# Patient Record
Sex: MALE | Race: WHITE | URBAN - METROPOLITAN AREA
[De-identification: names, ages, dates, MRNs, and addresses within clinical notes are randomized per-mention and may not be internally consistent; named-entity substitution may affect disease eponyms.]

---

## 2018-04-09 ENCOUNTER — RADIANT APPOINTMENT (OUTPATIENT)
Dept: GENERAL RADIOLOGY | Facility: CLINIC | Age: 38
End: 2018-04-09
Attending: PEDIATRICS
Payer: COMMERCIAL

## 2018-04-09 ENCOUNTER — OFFICE VISIT (OUTPATIENT)
Dept: ORTHOPEDICS | Facility: CLINIC | Age: 38
End: 2018-04-09
Payer: COMMERCIAL

## 2018-04-09 VITALS
DIASTOLIC BLOOD PRESSURE: 88 MMHG | WEIGHT: 215 LBS | BODY MASS INDEX: 29.12 KG/M2 | SYSTOLIC BLOOD PRESSURE: 134 MMHG | HEIGHT: 72 IN

## 2018-04-09 DIAGNOSIS — M25.562 LEFT KNEE PAIN, UNSPECIFIED CHRONICITY: Primary | ICD-10-CM

## 2018-04-09 DIAGNOSIS — M25.562 LEFT KNEE PAIN, UNSPECIFIED CHRONICITY: ICD-10-CM

## 2018-04-09 PROCEDURE — 99204 OFFICE O/P NEW MOD 45 MIN: CPT | Performed by: PEDIATRICS

## 2018-04-09 PROCEDURE — 73562 X-RAY EXAM OF KNEE 3: CPT | Performed by: PEDIATRICS

## 2018-04-09 NOTE — LETTER
4/9/2018         RE: Keyur Olivares  [address] [place] [address]        Dear Colleague,    Thank you for referring your patient, Keyur Olivares, to the [facility]. Please see a copy of my visit note below.    Sports Medicine Clinic Visit    PCP: No Ref-Primary, Physician    Keyur Olivares is a 38 year old male who is seen  as a self referral presenting with left knee pain.  Pain has been present for the past few months, but has been increasing over the past week.  No known injury.  Pain is diffuse thru the knee.  Does have pain that travels to his foot with prolonged standing.      **  Left knee pain started out as a slight pain. Patient sits in an awkward position when working on the floor for his job which may be related to pain. Did also get puppy recently. Pain most prominent in the medial knee. Did note crepitus under the patella and swelling. Currently limping. No painful popping.     Injury: gradual onset     Location of Pain: left knee, diffuse   Duration of Pain: 2-3 month(s)  Rating of Pain at worst: 7/10  Rating of Pain Currently: 6/10  Symptoms are better with: knee brace  Symptoms are worse with: walking, prolonged standing, squatting, stairs   Additional Features:   Positive: swelling, grinding and instability   Negative: bruising, popping, catching, locking, paresthesias, numbness, weakness, pain in other joints and systemic symptoms  Other evaluation and/or treatments so far consists of: Nothing  Prior History of related problems: denies     Social History: HVAC    Review of Systems  Musculoskeletal: as above  Remainder of review of systems is negative including constitutional, CV, pulmonary, GI, Skin and Neurologic except as noted in HPI or medical history.    This document serves as a record of the services and decisions personally performed and made by Riaz Peterson DO, CAQ. It was created on his behalf by lor Faulkner  "trained medical scribe. The creation of this document is based the provider's statements to the medical scribe.  Duc Hilton April 9, 2018 11:25 AM      No past medical history on file.  Past Surgical History:   Procedure Laterality Date     ORTHOPEDIC SURGERY  1997    L Shoulder reconstruction     Family History   Problem Relation Age of Onset     Asthma Father      CANCER Father      lung cancer     DIABETES Paternal Grandfather      CANCER Paternal Grandfather      C.A.D. No family hx of      Hypertension No family hx of      CEREBROVASCULAR DISEASE No family hx of      Breast Cancer No family hx of      Cancer - colorectal No family hx of      Prostate Cancer No family hx of      Social History     Social History     Marital status:      Spouse name: N/A     Number of children: N/A     Years of education: N/A     Occupational History     Not on file.     Social History Main Topics     Smoking status: Current Some Day Smoker     Last attempt to quit: 4/15/2014     Smokeless tobacco: Current User     Types: Chew     Alcohol use Yes      Comment: once weekly     Drug use: No     Sexual activity: Yes     Other Topics Concern     Parent/Sibling W/ Cabg, Mi Or Angioplasty Before 65f 55m? No     Social History Narrative       Objective  /88  Ht 5' 11.5\" (1.816 m)  Wt 215 lb (97.5 kg)  BMI 29.57 kg/m2    GENERAL APPEARANCE: healthy, alert and no distress   GAIT: antalgic  SKIN: no suspicious lesions or rashes  NEURO: Normal strength and tone, mentation intact and speech normal  PSYCH:  mentation appears normal and affect normal/bright  HEENT: no scleral icterus  CV: no extremity edema   RESP: nonlabored breathing    Left Knee exam    ROM:        Flexion ~110 degrees, tight        Extension full, symmetric, no change in pain    Inspection:       no visible ecchymosis       Moderate effusion       Mild fullness in the popliteal crease     Skin:       no visible deformities       well perfused       " capillary refill brisk    Patellar Motion:        Normal patellar tracking noted through range of motion    Tender:        medial joint line    Non Tender:         remainder of knee area    Special Tests:        positive (+) Melissa, slight medial knee pain, no clicking, popping or snapping        neg (-) Lachman       neg (-) anterior drawer       neg (-) posterior drawer       neg (-) varus, no pain        neg (-) valgus for laxity, slight pain lateral knee        Medial and posterior knee pain with forced extension    Radiology  Visualized radiographs of left knee obtained today, and reviewed the images with the patient.  Impression: Bilateral Hall, bilateral sunrise, and left lateral views of the knees demonstrate no acute osseous abnormality. Joint spaces appear preserved. There is a left knee joint effusion.       Assessment:  1. Left knee pain, unspecified chronicity    suspect medial meniscus tear.    Plan:  Discussed the assessment with the patient.    Plain films of the area reviewed with the patient.    Discussed:  *Symptom Treatment - Ice, Over the Counter Medications   *Activity Modification -   *Imaging - X-Ray reviewed; consider MRI of the left knee   *Support - sleeve, wrap; knee padding if kneeling  *Rehab - Physical Therapy   *Injection - Corticosteroid injection   *Supplements - Glucosamine (he inquired); he may try if desired  *Referral to Orthopedic Surgeon - possible pending MRI results    Topical Treatments: Ice prn pain, swelling.  Over the counter medication: Patient's preferred OTC medication as directed on packaging.  MRI of the left knee; next step   Activity Modification: discussed what to alter/avoid  Follow up: call with results of MRI   Questions answered. The patient indicates understanding of these issues and agrees with the plan.     Riaz Peterson, DO, CAQ       Disclaimer: This note consists of symbols derived from keyboarding, dictation and/or voice recognition  software. As a result, there may be errors in the script that have gone undetected. Please consider this when interpreting information found in this chart.    The information in this document, created by the medical scribe for me, accurately reflects the services I personally performed and the decisions made by me. I have reviewed and approved this document for accuracy.   Riaz Peterson DO, ELENA     Again, thank you for allowing me to participate in the care of your patient.        Sincerely,        Riaz Peterson DO

## 2018-04-09 NOTE — MR AVS SNAPSHOT
After Visit Summary   4/9/2018    Keyur Olivares    MRN: [id number]           Patient Information     Date Of Birth          1980        Visit Information        Provider Department      4/9/2018 11:00 AM Riaz Peterson,  [facility]        Today's Diagnoses     Left knee pain, unspecified chronicity    -  1      Care Instructions    Schedule MRI     We will call with results of MRI     Continue to ice and modify your activities      Advanced imaging is done by appointment. Some insurance require a prior authorization to be completed which may delay the time until you are able to schedule your appointment. Y[facility] call [facility]: [phone] to schedule your MRI.  Depending on your availability you can usually schedule within the next 1-2 days.    The clinic will call you with results, if you have not heard from the clinic within 3-4 days following your MRI please contact us at the number listed below.   If you have any further questions for your physician or physician s care team you can call [phone] and use option 3 to leave a voice message. Calls received during business hours will be returned same day.                  Follow-ups after your visit        Future tests that were ordered for you today     Open Future Orders        Priority Expected Expires Ordered    MR Knee Left w/o Contrast Routine  4/9/2019 4/9/2018            Who to contact     If you have questions or need follow up information about today's clinic visit or your schedule please contact [facility] directly at [phone].  Normal or non-critical lab and imaging results will be communicated to you by MyChart, letter or phone within 4 business days after the clinic has received the results. If you do not hear from us within 7 days, please contact the clinic through MyChart or phone. If you have a critical or abnormal lab  "result, we will notify you by phone as soon as possible.  Submit refill requests through [employer] or call your pharmacy and they will forward the refill request to us. Please allow 3 business days for your refill to be completed.          Additional Information About Your Visit        [employer] Information     [employer] gives you secure access to your electronic health record. If you see a primary care provider, you can also send messages to your care team and make appointments. If you have questions, please call your primary care clinic.  If you do not have a primary care provider, please call [phone] and they will assist you.        Care EveryWhere ID     This is your Care EveryWhere ID. This could be used by other organizations to access your [facility] medical records  [id number]        Your Vitals Were     Height BMI (Body Mass Index)                5' 11.5\" (1.816 m) 29.57 kg/m2           Blood Pressure from Last 3 Encounters:   04/09/18 134/88   05/07/15 124/82   05/22/14 114/75    Weight from Last 3 Encounters:   04/09/18 215 lb (97.5 kg)   05/07/15 220 lb (99.8 kg)   05/22/14 199 lb (90.3 kg)               Primary Care Provider Fax #    Physician No Ref-Primary [phone]       No address on file        Equal Access to Services     JENNIFER DUQUE : Hadii zulay clark hadasho Soomaali, waaxda luqadaha, qaybta kaalmada adeegyada, waxay idiin hayredn yfn herndon. So [facility] [phone].    ATENCIÓN: Si habla español, tiene a sellers disposición servicios gratuitos de asistencia lingüística. Llame al [phone].    We comply with applicable federal civil rights laws and [place] laws. We do not discriminate on the basis of race, color, national origin, age, disability, sex, sexual orientation, or gender identity.            Thank you!     Thank you for choosing [facility]  for your care. Our goal is always to provide you with excellent care. Hearing back from our patients is one " way we can continue to improve our services. Please take a few minutes to complete the written survey that you may receive in the mail after your visit with us. Thank you!             Your Updated Medication List - Protect others around you: Learn how to safely use, store and throw away your medicines at www.disposemymeds.org.          This list is accurate as of 4/9/18 11:41 AM.  Always use your most recent med list.                   Brand Name Dispense Instructions for use Diagnosis    metroNIDAZOLE 500 MG tablet    FLAGYL    14 tablet    Take 1 tablet (500 mg) by mouth 2 times daily    Screen for STD (sexually transmitted disease)       tadalafil 10 MG tablet    CIALIS    12 tablet    Take 0.5-1 tablets (5-10 mg) by mouth daily as needed for erectile dysfunction Never use with nitroglycerin, terazosin or doxazosin.    ED (erectile dysfunction)

## 2018-04-09 NOTE — PATIENT INSTRUCTIONS
Schedule MRI     We will call with results of MRI     Continue to ice and modify your activities      Advanced imaging is done by appointment. Some insurance require a prior authorization to be completed which may delay the time until you are able to schedule your appointment. [place] call [place] [place] [facility] [place]: [phone] to schedule your MRI.  Depending on your availability you can usually schedule within the next 1-2 days.    The clinic will call you with results, if you have not heard from the clinic within 3-4 days following your MRI please contact us at the number listed below.   If you have any further questions for your physician or physician s care team you can call [phone] and use option 3 to leave a voice message. Calls received during business hours will be returned same day.

## 2018-04-09 NOTE — PROGRESS NOTES
Sports Medicine Clinic Visit    PCP: No Ref-Primary, Physician    Keyur Olivares is a 38 year old male who is seen  as a self referral presenting with left knee pain.  Pain has been present for the past few months, but has been increasing over the past week.  No known injury.  Pain is diffuse thru the knee.  Does have pain that travels to his foot with prolonged standing.      **  Left knee pain started out as a slight pain. Patient sits in an awkward position when working on the floor for his job which may be related to pain. Did also get puppy recently. Pain most prominent in the medial knee. Did note crepitus under the patella and swelling. Currently limping. No painful popping.     Injury: gradual onset     Location of Pain: left knee, diffuse   Duration of Pain: 2-3 month(s)  Rating of Pain at worst: 7/10  Rating of Pain Currently: 6/10  Symptoms are better with: knee brace  Symptoms are worse with: walking, prolonged standing, squatting, stairs   Additional Features:   Positive: swelling, grinding and instability   Negative: bruising, popping, catching, locking, paresthesias, numbness, weakness, pain in other joints and systemic symptoms  Other evaluation and/or treatments so far consists of: Nothing  Prior History of related problems: denies     Social History: HVAC    Review of Systems  Musculoskeletal: as above  Remainder of review of systems is negative including constitutional, CV, pulmonary, GI, Skin and Neurologic except as noted in HPI or medical history.    This document serves as a record of the services and decisions personally performed and made by Riaz Peterson DO, CAQ. It was created on his behalf by Duc Hilton, a trained medical scribe. The creation of this document is based the provider's statements to the medical scribe.  Duc Hilton April 9, 2018 11:25 AM      No past medical history on file.  Past Surgical History:   Procedure Laterality Date     ORTHOPEDIC SURGERY  1997  "   L Shoulder reconstruction     Family History   Problem Relation Age of Onset     Asthma Father      CANCER Father      lung cancer     DIABETES Paternal Grandfather      CANCER Paternal Grandfather      C.A.D. No family hx of      Hypertension No family hx of      CEREBROVASCULAR DISEASE No family hx of      Breast Cancer No family hx of      Cancer - colorectal No family hx of      Prostate Cancer No family hx of      Social History     Social History     Marital status:      Spouse name: N/A     Number of children: N/A     Years of education: N/A     Occupational History     Not on file.     Social History Main Topics     Smoking status: Current Some Day Smoker     Last attempt to quit: 4/15/2014     Smokeless tobacco: Current User     Types: Chew     Alcohol use Yes      Comment: once weekly     Drug use: No     Sexual activity: Yes     Other Topics Concern     Parent/Sibling W/ Cabg, Mi Or Angioplasty Before 65f 55m? No     Social History Narrative       Objective  /88  Ht 5' 11.5\" (1.816 m)  Wt 215 lb (97.5 kg)  BMI 29.57 kg/m2    GENERAL APPEARANCE: healthy, alert and no distress   GAIT: antalgic  SKIN: no suspicious lesions or rashes  NEURO: Normal strength and tone, mentation intact and speech normal  PSYCH:  mentation appears normal and affect normal/bright  HEENT: no scleral icterus  CV: no extremity edema   RESP: nonlabored breathing    Left Knee exam    ROM:        Flexion ~110 degrees, tight        Extension full, symmetric, no change in pain    Inspection:       no visible ecchymosis       Moderate effusion       Mild fullness in the popliteal crease     Skin:       no visible deformities       well perfused       capillary refill brisk    Patellar Motion:        Normal patellar tracking noted through range of motion    Tender:        medial joint line    Non Tender:         remainder of knee area    Special Tests:        positive (+) Melissa, slight medial knee pain, no clicking, " popping or snapping        neg (-) Lachman       neg (-) anterior drawer       neg (-) posterior drawer       neg (-) varus, no pain        neg (-) valgus for laxity, slight pain lateral knee        Medial and posterior knee pain with forced extension    Radiology  Visualized radiographs of left knee obtained today, and reviewed the images with the patient.  Impression: Bilateral Hall, bilateral sunrise, and left lateral views of the knees demonstrate no acute osseous abnormality. Joint spaces appear preserved. There is a left knee joint effusion.       Assessment:  1. Left knee pain, unspecified chronicity    suspect medial meniscus tear.    Plan:  Discussed the assessment with the patient.    Plain films of the area reviewed with the patient.    Discussed:  *Symptom Treatment - Ice, Over the Counter Medications   *Activity Modification -   *Imaging - X-Ray reviewed; consider MRI of the left knee   *Support - sleeve, wrap; knee padding if kneeling  *Rehab - Physical Therapy   *Injection - Corticosteroid injection   *Supplements - Glucosamine (he inquired); he may try if desired  *Referral to Orthopedic Surgeon - possible pending MRI results    Topical Treatments: Ice prn pain, swelling.  Over the counter medication: Patient's preferred OTC medication as directed on packaging.  MRI of the left knee; next step   Activity Modification: discussed what to alter/avoid  Follow up: call with results of MRI   Questions answered. The patient indicates understanding of these issues and agrees with the plan.     Riaz Peterson, , CAQ       Disclaimer: This note consists of symbols derived from keyboarding, dictation and/or voice recognition software. As a result, there may be errors in the script that have gone undetected. Please consider this when interpreting information found in this chart.    The information in this document, created by the medical scribe for me, accurately reflects the services I personally  performed and the decisions made by me. I have reviewed and approved this document for accuracy.   Riaz Peterson DO, CAQ

## 2018-04-11 ENCOUNTER — RADIANT APPOINTMENT (OUTPATIENT)
Dept: MRI IMAGING | Facility: CLINIC | Age: 38
End: 2018-04-11
Attending: PEDIATRICS
Payer: COMMERCIAL

## 2018-04-11 ENCOUNTER — RADIANT APPOINTMENT (OUTPATIENT)
Dept: GENERAL RADIOLOGY | Facility: CLINIC | Age: 38
End: 2018-04-11
Attending: PEDIATRICS
Payer: COMMERCIAL

## 2018-04-11 DIAGNOSIS — T15.90XA: ICD-10-CM

## 2018-04-11 DIAGNOSIS — M25.562 LEFT KNEE PAIN, UNSPECIFIED CHRONICITY: ICD-10-CM

## 2018-04-11 PROCEDURE — 70030 X-RAY EYE FOR FOREIGN BODY: CPT | Mod: FY

## 2018-04-11 PROCEDURE — 73721 MRI JNT OF LWR EXTRE W/O DYE: CPT | Mod: TC

## 2018-04-13 ENCOUNTER — TELEPHONE (OUTPATIENT)
Dept: ORTHOPEDICS | Facility: CLINIC | Age: 38
End: 2018-04-13

## 2018-04-13 DIAGNOSIS — S83.242D OTHER TEAR OF MEDIAL MENISCUS OF LEFT KNEE, UNSPECIFIED WHETHER OLD OR CURRENT TEAR, SUBSEQUENT ENCOUNTER: Primary | ICD-10-CM

## 2018-04-13 NOTE — TELEPHONE ENCOUNTER
Results for orders placed or performed in visit on 04/11/18   MR Knee Left w/o Contrast    Narrative    MR LEFT KNEE WITHOUT CONTRAST   4/11/2018 1:48 PM    HISTORY:  Evaluate medial meniscus. Left knee pain, unspecified  chronicity.    TECHNIQUE:  Sagittal proton density and T2, coronal T1, and coronal  and transverse fat suppressed T2 weighted images.    FINDINGS:   Medial Meniscus: There is a horizontal tear in the posterior horn  extending from the inferior articular surface to the meniscocapsular  junction. This extends concentrically to the body of the medial  meniscus. A small displaced meniscal flap is suggested far posteriorly  adjacent to the posterior cruciate ligament. Small amount of fluid is  noted in the tibia collateral ligament bursa. No meniscal cyst.       Lateral Meniscus: No tear, displaced fragment, or extrusion.       Anterior Cruciate Ligament: The ACL appears intact. Small lobulated  cysts are noted between the ACL and posterior cruciate ligament,  likely representing a small ganglion cyst.     Posterior Cruciate Ligament: The posterior cruciate ligament appears  intact.     Medial Collateral Ligament: No sprain or tear identified.    Lateral Collateral Ligament Complex, Popliteus Tendon: The fibular  collateral ligament, biceps femoris tendon, popliteal tendon, and  iliotibial band are intact.    Osseous Structures and Cartilaginous Surfaces:  Articular cartilage  surfaces in the medial, lateral, and patellofemoral compartments  appear within normal limits. Marrow signal is within normal limits.    Extensor Mechanism: The quadriceps and infrapatellar tendons are  intact. The medial and lateral patellar retinacula appear  unremarkable.    Joint Space: There is a moderate joint effusion.      Impression    IMPRESSION:    1. MCL posterior horn horizontal tear with suspected small meniscal  flap far posteriorly adjacent to the posterior cruciate ligament.  2. Septated cyst likely representing a  small ganglion cyst between the  ACL and posterior cruciate ligament. The cruciate ligaments appear  intact.  3. Joint effusion.    ANNAMARIE CABRERA MD

## 2018-04-13 NOTE — TELEPHONE ENCOUNTER
"MRI confirms medial mensicus tear. Remainder of findings are as noted in report.  Options: 1) symptom treatment, activity modification, rehab; 2) trial steroid injection for pain relief (will not \"fix\" tear); 3) referral to ortho surgeon for discussion of other intervention, which may include surgery.  If interested in injection or further discussion, return to clinic. May refer if he desires.  Thanks.  Riaz Peterson, , CAQ    "

## 2018-04-19 ENCOUNTER — OFFICE VISIT (OUTPATIENT)
Dept: ORTHOPEDICS | Facility: CLINIC | Age: 38
End: 2018-04-19
Payer: COMMERCIAL

## 2018-04-19 VITALS
HEIGHT: 72 IN | RESPIRATION RATE: 16 BRPM | SYSTOLIC BLOOD PRESSURE: 137 MMHG | WEIGHT: 216.6 LBS | BODY MASS INDEX: 29.34 KG/M2 | DIASTOLIC BLOOD PRESSURE: 84 MMHG

## 2018-04-19 DIAGNOSIS — S83.242A TEAR OF MEDIAL MENISCUS OF LEFT KNEE, CURRENT, UNSPECIFIED TEAR TYPE, INITIAL ENCOUNTER: Primary | ICD-10-CM

## 2018-04-19 PROCEDURE — 99204 OFFICE O/P NEW MOD 45 MIN: CPT | Performed by: ORTHOPAEDIC SURGERY

## 2018-04-19 ASSESSMENT — PAIN SCALES - GENERAL: PAINLEVEL: SEVERE PAIN (6)

## 2018-04-19 NOTE — PATIENT INSTRUCTIONS
SMOKING CESSATION  What's in cigarette smoke? - Cigarette smoke contains over 4,000 chemicals. Nicotine is one of the main ingredients which is an insecticide/herbicide. It is poisonous to our nervous system, increases blood clotting risk, and decreases the body's defenses to fight off infection. Another chemical is Carbon Monoxide is an asphyxiating gas that permanently binds to blood cells and blocks the transport of oxygen. This leads to tissue death and decreases your metabolism. Tar is a chemical that coats your lungs and trachea which impairs new oxygen coming in and carbon dioxide getting out of your body.   How does smoking impact surgery? - Smoking is particularly hazardous with regards to surgery. Surgery puts stress on the body and a smoker's body is already under strain from these chemicals. Putting the two together, especially for an elective surgery, could be a recipe for disaster. Smoking before and after surgery increases your risk of heart problems, slow wound healing, delayed bone healing, blood clots, wound infection and anesthesia complications.   What are the benefits of quitting? - In 20 minutes your blood pressure will drop back down to normal. In 8 hours the carbon monoxide (a toxic gas) levels in your blood stream will drop by half, and oxygen levels will return to normal. In 48 hours your chance of having a heart attack will have decreased. All nicotine will have left your body. Your sense of taste and smell will return to a normal level. In 72 hours your bronchial tubes will relax, and your energy levels will increase. In 2 weeks your circulation will increase, and it will continue to improve for the next 10 weeks.    Recommendations for elective surgery - Ideally, patients should quit smoking 8 weeks before and at least 2 weeks after elective surgery in order to avoid complications. Simply cutting back on the amount of cigarettes smoked per day does not offer any benefit or decrease the  risk of poor wound healing, heart problems, and infection. Smokers should also start taking Vitamin C and B for two weeks before surgery and two weeks after surgery.    Ways to Stop Smokin. Nicotine patches, lozenges, or gum  2. Support Groups  3. Medications (see below)    List of Medications:  1. Varenicline Tartrate (CHANTIX)   2. Bupropion HCL (WELLBUTRIN, ZYBAN) - note: make sure Wellbutrin is for smoking cessation and not other issues   3. Nicotine Patch (NICODERM)   4. Nicotine Inhaler (NICOTROL)   5. Nicotine Gum Nicotine Polacrilex   6. Nicotine Lozenge: Nicotine Polacrilex (COMMIT)   * [facility] offers a smoking support group as well!  Please visit: [email or web address]  If you are interested in these, ask about getting a prescription or talk to your primary care doctor about what may be the best way for you to quit.

## 2018-04-19 NOTE — MR AVS SNAPSHOT
After Visit Summary   4/19/2018    Keyur Olivares    MRN: [id number]           Patient Information     Date Of Birth          1980        Visit Information        Provider Department      4/19/2018 4:15 PM Dustin Menchaca MD [facility]        Today's Diagnoses     Tear of medial meniscus of left knee, current, unspecified tear type, initial encounter    -  1      Care Instructions    SMOKING CESSATION  What's in cigarette smoke? - Cigarette smoke contains over 4,000 chemicals. Nicotine is one of the main ingredients which is an insecticide/herbicide. It is poisonous to our nervous system, increases blood clotting risk, and decreases the body's defenses to fight off infection. Another chemical is Carbon Monoxide is an asphyxiating gas that permanently binds to blood cells and blocks the transport of oxygen. This leads to tissue death and decreases your metabolism. Tar is a chemical that coats your lungs and trachea which impairs new oxygen coming in and carbon dioxide getting out of your body.   How does smoking impact surgery? - Smoking is particularly hazardous with regards to surgery. Surgery puts stress on the body and a smoker's body is already under strain from these chemicals. Putting the two together, especially for an elective surgery, could be a recipe for disaster. Smoking before and after surgery increases your risk of heart problems, slow wound healing, delayed bone healing, blood clots, wound infection and anesthesia complications.   What are the benefits of quitting? - In 20 minutes your blood pressure will drop back down to normal. In 8 hours the carbon monoxide (a toxic gas) levels in your blood stream will drop by half, and oxygen levels will return to normal. In 48 hours your chance of having a heart attack will have decreased. All nicotine will have left your body. Your sense of taste and smell will return to a normal level. In 72 hours  your bronchial tubes will relax, and your energy levels will increase. In 2 weeks your circulation will increase, and it will continue to improve for the next 10 weeks.    Recommendations for elective surgery - Ideally, patients should quit smoking 8 weeks before and at least 2 weeks after elective surgery in order to avoid complications. Simply cutting back on the amount of cigarettes smoked per day does not offer any benefit or decrease the risk of poor wound healing, heart problems, and infection. Smokers should also start taking Vitamin C and B for two weeks before surgery and two weeks after surgery.    Ways to Stop Smokin. Nicotine patches, lozenges, or gum  2. Support Groups  3. Medications (see below)    List of Medications:  1. Varenicline Tartrate (CHANTIX)   2. Bupropion HCL (WELLBUTRIN, ZYBAN) - note: make sure Wellbutrin is for smoking cessation and not other issues   3. Nicotine Patch (NICODERM)   4. Nicotine Inhaler (NICOTROL)   5. Nicotine Gum Nicotine Polacrilex   6. Nicotine Lozenge: Nicotine Polacrilex (COMMIT)   * [place] offers a smoking support group as well!  Please visit: [email or web address]  If you are interested in these, ask about getting a prescription or talk to your primary care doctor about what may be the best way for you to quit.                 Follow-ups after your visit        Follow-up notes from your care team     Return for Postop Visit.      Who to contact     If you have questions or need follow up information about today's clinic visit or your schedule please contact [facility] directly at [phone].  Normal or non-critical lab and imaging results will be communicated to you by MyChart, letter or phone within 4 business days after the clinic has received the results. If you do not hear from us within 7 days, please contact the clinic through MyChart or phone. If you have a critical or abnormal lab result, we will  notify you by phone as soon as possible.  Submit refill requests through [employer] or call your pharmacy and they will forward the refill request to us. Please allow 3 business days for your refill to be completed.          Additional Information About Your Visit        [employer] Information     [employer] gives you secure access to your electronic health record. If you see a primary care provider, you can also send messages to your care team and make appointments. If you have questions, please call your primary care clinic.  If you do not have a primary care provider, please call [phone] and they will assist you.        Care EveryWhere ID     This is your Care EveryWhere ID. This could be used by other organizations to access your [facility] medical records  [id number]        Your Vitals Were     Respirations Height BMI (Body Mass Index)             16 6' (1.829 m) 29.38 kg/m2          Blood Pressure from Last 3 Encounters:   04/19/18 137/84   04/09/18 134/88   05/07/15 124/82    Weight from Last 3 Encounters:   04/19/18 216 lb 9.6 oz (98.2 kg)   04/09/18 215 lb (97.5 kg)   05/07/15 220 lb (99.8 kg)              We Performed the Following     [facility]-Operative Worksheet        Primary Care Provider Fax #    Physician No Ref-Primary [phone]       No address on file        Equal Access to Services     JENNIFER DUQUE : Hadii zulay ku hadasho Soomaali, waaxda luqadaha, qaybta kaalmada adeegyada, hussein man . So [facility] [phone].    ATENCIÓN: Si habla español, tiene a sellers disposición servicios gratuitos de asistencia lingüística. Llame al [phone].    We comply with applicable federal civil rights laws and [place] laws. We do not discriminate on the basis of race, color, national origin, age, disability, sex, sexual orientation, or gender identity.            Thank you!     Thank you for choosing [facility]  for your care. Our goal is always to provide you  with excellent care. Hearing back from our patients is one way we can continue to improve our services. Please take a few minutes to complete the written survey that you may receive in the mail after your visit with us. Thank you!             Your Updated Medication List - Protect others around you: Learn how to safely use, store and throw away your medicines at www.disposemymeds.org.          This list is accurate as of 4/19/18 11:59 PM.  Always use your most recent med list.                   Brand Name Dispense Instructions for use Diagnosis    metroNIDAZOLE 500 MG tablet    FLAGYL    14 tablet    Take 1 tablet (500 mg) by mouth 2 times daily    Screen for STD (sexually transmitted disease)       tadalafil 10 MG tablet    CIALIS    12 tablet    Take 0.5-1 tablets (5-10 mg) by mouth daily as needed for erectile dysfunction Never use with nitroglycerin, terazosin or doxazosin.    ED (erectile dysfunction)

## 2018-04-19 NOTE — PROGRESS NOTES
"CHIEF COMPLAINT:   Chief Complaint   Patient presents with     Knee Pain     Left knee pain. medial and lateral joint line. Onset: 3 months. NKI. Has been icing, elevating, bracing. Ibuprofen. works in [employer]     Keyur Olivares is seen today in the [facility] for evaluation of left knee pain at the request of Dr. Riaz Peterson.     HISTORY OF PRESENT ILLNESS    Keyur \"OLGA\"ROCKY Olivares is a 38 year old male seen for evaluation of ongoing left knee pain with no known injury. Pain has been present for 3 months, since January 2018. He presents today with severe pain, rated a 6/10. Pain is located over the medial lateral and posterior knee. Pain is worsened with stairs. He has noticed swelling in the knee with activities. For treatment, he takes ibuprofen 800 mg daily and uses the brace. He does not recall any increase or change in activities that may have caused knee pain. No history of knee problems. Denies low back or hip problems.     Present symptoms: severe pain, + locking and catching, + swelling.    Pain severity: 6/10  Frequency of symptoms: frequently  Exacerbating Factors: weight bearing  Relieving Factors: at rest  Night Pain: No  Pain while at rest: No   Numbness or tingling: No   Patient has tried:     NSAIDS: Yes      Physical Therapy: No      Activity modification: Yes      Bracing: Yes      Injections: No     Ice: No      Assistive device:  No     Other: none    Orthopaedic PMH: none    Other PMH:  has a past medical history of Environmental allergies.  Patient Active Problem List    Diagnosis Date Noted     ED (erectile dysfunction) 08/06/2015     Priority: Medium     CARDIOVASCULAR SCREENING; LDL GOAL LESS THAN 160 03/25/2013     Priority: Medium       Surgical Hx:  has a past surgical history that includes orthopedic surgery (1997).    Medications:   Current Outpatient Prescriptions:      metroNIDAZOLE (FLAGYL) 500 MG tablet, Take 1 tablet (500 mg) by mouth 2 times daily, " Disp: 14 tablet, Rfl: 0     tadalafil (CIALIS) 10 MG tablet, Take 0.5-1 tablets (5-10 mg) by mouth daily as needed for erectile dysfunction Never use with nitroglycerin, terazosin or doxazosin., Disp: 12 tablet, Rfl: 11    Allergies: No Known Allergies    Social Hx: . reports that he has been smoking.  His smokeless tobacco use includes Chew. He reports that he drinks alcohol. He reports that he does not use illicit drugs.    Family Hx: family history includes Asthma in his father; CANCER in his father and paternal grandfather; DIABETES in his paternal grandfather; MENTAL ILLNESS in his father. There is no history of C.A.D., Hypertension, CEREBROVASCULAR DISEASE, Breast Cancer, Cancer - colorectal, or Prostate Cancer.    REVIEW OF SYSTEMS: 10 point ROS neg other than the symptoms noted above in the HPI and PMH. Notables include  CONSTITUTIONAL:NEGATIVE for fever, chills, change in weight  INTEGUMENTARY/SKIN: NEGATIVE for worrisome rashes, moles or lesions  MUSCULOSKELETAL:See HPI above  NEURO: NEGATIVE for weakness, dizziness or paresthesias    This document serves as a record of the services and decisions personally performed and made by Dustin Menchaca MD. It was created on his behalf by Erendira Garcia, a trained medical scribe. The creation of this document is based the provider's statements to the medical scribe.    Yobany Garcia 4:36 PM 4/19/2018    PHYSICAL EXAM:  /84  Resp 16  Ht 6' (1.829 m)  Wt 216 lb 9.6 oz (98.2 kg)  BMI 29.38 kg/m2   GENERAL APPEARANCE: healthy, alert, no distress  SKIN: no suspicious lesions or rashes  NEURO: Normal strength and tone, mentation intact and speech normal  PSYCH:  mentation appears normal and affect normal, not anxious  RESPIRATORY: No increased work of breathing.  HANDS: no clubbing, nail pitting.    BILATERAL LOWER EXTREMITIES:  Gait: normal  Alignment: varus  No gross deformities or masses.  No Quad atrophy, strength normal.  Intact sensation  deep peroneal nerve, superficial peroneal nerve, med/lat tibial nerve, sural nerve, saphenous nerve  Intact EHL, EDL, TA, FHL, GS, quadriceps hamstrings and hip flexors  Toes warm and well perfused, brisk capillary refill. Palpable 2+ dp pulses.  Bilateral calf soft and nttp or squeeze.  No palpable popliteal lymphadenopathy.  DTRs: achilles 2+, patella 2+.  Edema: none  Hips with full, pain-free motion. No irritability with flexion, adduction, and internal rotation.    LEFT KNEE EXAM:    Skin: intact, no ecchymosis or erythema, abrasion anterior left leg, callused skin over anterior left knee.   Squat: 50%, anterolateral discomfort     ROM: 0 extension, posterior discomfort with forced extension to 130 flexion  Tight hamstrings on straight leg raise.  Effusion: small  Tender: medial joint line, patellar tendon   NTTP lat joint line, anterior or posterior knee  McMurrays: positive - medial    MCL: stable, and non-painful at both 0 and 30 degrees knee flexion  Varus stress: stable, and non-painful at both 0 and 30 degrees knee flexion  Lachmans: neg, firm endpoint  Posterior Drawer stable  Patellofemoral joint:                Apprehension: negative              Crepitations: mild   Grind: positive    RIGHT KNEE EXAM:    Skin: intact, no ecchymosis or erythema, callused skin over anterior right knee  ROM: 1 hyperextension to 135+ flexion  Tight hamstrings on straight leg raise.  Effusion: none  Tender: NTTP med/lat joint line, anterior or posterior knee  McMurrays: negative    MCL: stable, and non-painful at both 0 and 30 degrees knee flexion  Varus stress: stable, and non-painful at both 0 and 30 degrees knee flexion  Lachmans: neg, firm endpoint  Posterior Drawer stable  Patellofemoral joint:                Apprehension: negative              Crepitations: minimal   Grind: negative    X-RAY:  Bilateral king, bilateral sunrise and lateral left knee from 4/9/2018 were reviewed in clinic today. On my review, no  obvious fractures or dislocations. Effusion.      MRI:  MRI left knee from 4/19/2018 was reviewed in clinic today.    IMPRESSION:    1. Medial meniscus posterior horn horizontal tear with suspected small meniscal  flap far posteriorly adjacent to the posterior cruciate ligament.  2. Septated cyst likely representing a small ganglion cyst between the  ACL and posterior cruciate ligament. The cruciate ligaments appear  intact.  3. Joint effusion.      Impression:   38 year old male with acute left knee pain, medial meniscus tear, chondromalacia.    Plan:   * discussed injury with patient, what appears to be a left knee medial meniscal tear on MRI, as well as some underlying mild chondromalacia changes, which is consistent with symptoms and physical examination findings.     * Discussed treatment options including nonoperative treatment with continued rest, ice, elevation, activity modification, NSAIDS and Physical Therapy, bracing and potential injections versus surgical treatment with arthroscopy and meniscal repair versus debridement. Risks and benefits of each discussed in detail.  * in the setting of underlying chondrosis, predictability of arthroscopy is uncertain, unless mechanical symptoms present due to the meniscus tear.    * surgical risks discussed: bleeding, infection, pain, scar, damage to adjacent structures (nerve, vessels, cartilage), stiffness, post-traumatic arthritis, failure to relieve symptoms, recurrence of symptoms, blood clots (DVT), pulmonary emolism, risks of anesthesia and death. This surgery is not intended nor expected to alleviate arthritic pain symptoms, nor will it treat or correct underlying arthritic changes. Arthritis and symptoms related to arthritis could worsen with arthroscopy and meniscal debridement. Patient understands.    * understanding the risks of surgery, patient elected to proceed with arthroscopy  * plan: left knee arthroscopy with partial meniscal debridement versus  repair, outpatient surgery  * will arrange at a time in future mutual convenience  * will need H+P from PCP prior to surgery  * patient will be on ASA x2 weeks postoperative, as well as use of TEDs, crutches  * plan Physical Therapy to start 1-2 weeks postoperative, to be determined at postoperative visit.  * return to clinic 2 week postop for wound check, sooner as needed.  * all questions addressed and answered prior to discharge from clinic today.  * patient to call if any questions or concerns in the meantime.    * BP recheck within normal limits.    The information in this document, created by a scribe for me, accurately reflects the services I personally performed and the decisions made by me. I have reviewed and approved this document for accuracy.     Dustin Menchaca M.D., M.S.  Dept. of Orthopaedic Surgery  [facility]

## 2018-04-19 NOTE — LETTER
"    4/19/2018         RE: Keyur Olivares  [address] [place] [address]        Dear Colleague,    Thank you for referring your patient, Keyur Olivares, to the [facility]. Please see a copy of my visit note below.    CHIEF COMPLAINT:   Chief Complaint   Patient presents with     Knee Pain     Left knee pain. medial and lateral joint line. Onset: 3 months. NKI. Has been icing, elevating, bracing. Ibuprofen. works in [employer]     Keyur Olivares is seen today in the [facility] for evaluation of left knee pain at the request of Dr. Riaz Peterson.     HISTORY OF PRESENT ILLNESS    Keyur \"OLGA\"ROCKY Olivares is a 38 year old male seen for evaluation of ongoing left knee pain with no known injury. Pain has been present for 3 months, since January 2018. He presents today with severe pain, rated a 6/10. Pain is located over the medial lateral and posterior knee. Pain is worsened with stairs. He has noticed swelling in the knee with activities. For treatment, he takes ibuprofen 800 mg daily and uses the brace. He does not recall any increase or change in activities that may have caused knee pain. No history of knee problems. Denies low back or hip problems.     Present symptoms: severe pain, + locking and catching, + swelling.    Pain severity: 6/10  Frequency of symptoms: frequently  Exacerbating Factors: weight bearing  Relieving Factors: at rest  Night Pain: No  Pain while at rest: No   Numbness or tingling: No   Patient has tried:     NSAIDS: Yes      Physical Therapy: No      Activity modification: Yes      Bracing: Yes      Injections: No     Ice: No      Assistive device:  No     Other: none    Orthopaedic PMH: none    Other PMH:  has a past medical history of Environmental allergies.  Patient Active Problem List    Diagnosis Date Noted     ED (erectile dysfunction) 08/06/2015     Priority: Medium     CARDIOVASCULAR SCREENING; LDL GOAL LESS " THAN 160 03/25/2013     Priority: Medium       Surgical Hx:  has a past surgical history that includes orthopedic surgery (1997).    Medications:   Current Outpatient Prescriptions:      metroNIDAZOLE (FLAGYL) 500 MG tablet, Take 1 tablet (500 mg) by mouth 2 times daily, Disp: 14 tablet, Rfl: 0     tadalafil (CIALIS) 10 MG tablet, Take 0.5-1 tablets (5-10 mg) by mouth daily as needed for erectile dysfunction Never use with nitroglycerin, terazosin or doxazosin., Disp: 12 tablet, Rfl: 11    Allergies: No Known Allergies    Social Hx: . reports that he has been smoking.  His smokeless tobacco use includes Chew. He reports that he drinks alcohol. He reports that he does not use illicit drugs.    Family Hx: family history includes Asthma in his father; CANCER in his father and paternal grandfather; DIABETES in his paternal grandfather; MENTAL ILLNESS in his father. There is no history of C.A.D., Hypertension, CEREBROVASCULAR DISEASE, Breast Cancer, Cancer - colorectal, or Prostate Cancer.    REVIEW OF SYSTEMS: 10 point ROS neg other than the symptoms noted above in the HPI and PMH. Notables include  CONSTITUTIONAL:NEGATIVE for fever, chills, change in weight  INTEGUMENTARY/SKIN: NEGATIVE for worrisome rashes, moles or lesions  MUSCULOSKELETAL:See HPI above  NEURO: NEGATIVE for weakness, dizziness or paresthesias    This document serves as a record of the services and decisions personally performed and made by Dustin Menchaca MD. It was created on his behalf by Erendira Garcia, a trained medical scribe. The creation of this document is based the provider's statements to the medical scribe.    Scribrandall Garcia 4:36 PM 4/19/2018    PHYSICAL EXAM:  /84  Resp 16  Ht 6' (1.829 m)  Wt 216 lb 9.6 oz (98.2 kg)  BMI 29.38 kg/m2   GENERAL APPEARANCE: healthy, alert, no distress  SKIN: no suspicious lesions or rashes  NEURO: Normal strength and tone, mentation intact and speech normal  PSYCH:  mentation appears  normal and affect normal, not anxious  RESPIRATORY: No increased work of breathing.  HANDS: no clubbing, nail pitting.    BILATERAL LOWER EXTREMITIES:  Gait: normal  Alignment: varus  No gross deformities or masses.  No Quad atrophy, strength normal.  Intact sensation deep peroneal nerve, superficial peroneal nerve, med/lat tibial nerve, sural nerve, saphenous nerve  Intact EHL, EDL, TA, FHL, GS, quadriceps hamstrings and hip flexors  Toes warm and well perfused, brisk capillary refill. Palpable 2+ dp pulses.  Bilateral calf soft and nttp or squeeze.  No palpable popliteal lymphadenopathy.  DTRs: achilles 2+, patella 2+.  Edema: none  Hips with full, pain-free motion. No irritability with flexion, adduction, and internal rotation.    LEFT KNEE EXAM:    Skin: intact, no ecchymosis or erythema, abrasion anterior left leg, callused skin over anterior left knee.   Squat: 50%, anterolateral discomfort     ROM: 0 extension, posterior discomfort with forced extension to 130 flexion  Tight hamstrings on straight leg raise.  Effusion: small  Tender: medial joint line, patellar tendon   NTTP lat joint line, anterior or posterior knee  McMurrays: positive - medial    MCL: stable, and non-painful at both 0 and 30 degrees knee flexion  Varus stress: stable, and non-painful at both 0 and 30 degrees knee flexion  Lachmans: neg, firm endpoint  Posterior Drawer stable  Patellofemoral joint:                Apprehension: negative              Crepitations: mild   Grind: positive    RIGHT KNEE EXAM:    Skin: intact, no ecchymosis or erythema, callused skin over anterior right knee  ROM: 1 hyperextension to 135+ flexion  Tight hamstrings on straight leg raise.  Effusion: none  Tender: NTTP med/lat joint line, anterior or posterior knee  McMurrays: negative    MCL: stable, and non-painful at both 0 and 30 degrees knee flexion  Varus stress: stable, and non-painful at both 0 and 30 degrees knee flexion  Lachmans: neg, firm  endpoint  Posterior Drawer stable  Patellofemoral joint:                Apprehension: negative              Crepitations: minimal   Grind: negative    X-RAY:  Bilateral king, bilateral sunrise and lateral left knee from 4/9/2018 were reviewed in clinic today. On my review, no obvious fractures or dislocations. Effusion.      MRI:  MRI left knee from 4/19/2018 was reviewed in clinic today.    IMPRESSION:    1. Medial meniscus posterior horn horizontal tear with suspected small meniscal  flap far posteriorly adjacent to the posterior cruciate ligament.  2. Septated cyst likely representing a small ganglion cyst between the  ACL and posterior cruciate ligament. The cruciate ligaments appear  intact.  3. Joint effusion.      Impression:   38 year old male with acute left knee pain, medial meniscus tear, chondromalacia.    Plan:   * discussed injury with patient, what appears to be a left knee medial meniscal tear on MRI, as well as some underlying mild chondromalacia changes, which is consistent with symptoms and physical examination findings.     * Discussed treatment options including nonoperative treatment with continued rest, ice, elevation, activity modification, NSAIDS and Physical Therapy, bracing and potential injections versus surgical treatment with arthroscopy and meniscal repair versus debridement. Risks and benefits of each discussed in detail.  * in the setting of underlying chondrosis, predictability of arthroscopy is uncertain, unless mechanical symptoms present due to the meniscus tear.    * surgical risks discussed: bleeding, infection, pain, scar, damage to adjacent structures (nerve, vessels, cartilage), stiffness, post-traumatic arthritis, failure to relieve symptoms, recurrence of symptoms, blood clots (DVT), pulmonary emolism, risks of anesthesia and death. This surgery is not intended nor expected to alleviate arthritic pain symptoms, nor will it treat or correct underlying arthritic  changes. Arthritis and symptoms related to arthritis could worsen with arthroscopy and meniscal debridement. Patient understands.    * understanding the risks of surgery, patient elected to proceed with arthroscopy  * plan: left knee arthroscopy with partial meniscal debridement versus repair, outpatient surgery  * will arrange at a time in future mutual convenience  * will need H+P from PCP prior to surgery  * patient will be on ASA x2 weeks postoperative, as well as use of TEDs, crutches  * plan Physical Therapy to start 1-2 weeks postoperative, to be determined at postoperative visit.  * return to clinic 2 week postop for wound check, sooner as needed.  * all questions addressed and answered prior to discharge from clinic today.  * patient to call if any questions or concerns in the meantime.    * BP recheck within normal limits.    The information in this document, created by a scribe for me, accurately reflects the services I personally performed and the decisions made by me. I have reviewed and approved this document for accuracy.     Dustin Menchaca M.D., M.S.  Dept. of Orthopaedic Surgery  [facility]        Again, thank you for allowing me to participate in the care of your patient.        Sincerely,        Dustin Menchaca MD

## 2018-04-20 ENCOUNTER — TELEPHONE (OUTPATIENT)
Dept: ORTHOPEDICS | Facility: CLINIC | Age: 38
End: 2018-04-20

## 2018-04-23 NOTE — TELEPHONE ENCOUNTER
Type of surgery: Left knee arthroscopy, medial meniscus debridement vs. Repair, possible chondral debridement CPT 20146  Tear of medial meniscus of left knee, current, unspecified tear type, initial encounter [S83.242A]  - Primary   [facility] of surgery:  [facility]  Date and time of surgery: May 3rd, 2018  Surgeon: Dr Menchaca  Pre-Op Appt Date: 4-25-18  Post-Op Appt Date: 5-17-18   Packet sent out: Yes  Pre-cert/Authorization completed:  No pre cert needed  Date: 04/23/2018

## 2018-04-25 ENCOUNTER — OFFICE VISIT (OUTPATIENT)
Dept: FAMILY MEDICINE | Facility: CLINIC | Age: 38
End: 2018-04-25
Payer: COMMERCIAL

## 2018-04-25 VITALS
DIASTOLIC BLOOD PRESSURE: 81 MMHG | HEART RATE: 61 BPM | TEMPERATURE: 97.7 F | BODY MASS INDEX: 29.02 KG/M2 | WEIGHT: 214 LBS | OXYGEN SATURATION: 99 % | SYSTOLIC BLOOD PRESSURE: 121 MMHG | RESPIRATION RATE: 16 BRPM

## 2018-04-25 DIAGNOSIS — S83.242A ACUTE MEDIAL MENISCUS TEAR OF LEFT KNEE, INITIAL ENCOUNTER: ICD-10-CM

## 2018-04-25 DIAGNOSIS — Z01.818 PREOP GENERAL PHYSICAL EXAM: Primary | ICD-10-CM

## 2018-04-25 PROCEDURE — 99214 OFFICE O/P EST MOD 30 MIN: CPT | Performed by: PHYSICIAN ASSISTANT

## 2018-04-25 ASSESSMENT — PAIN SCALES - GENERAL: PAINLEVEL: NO PAIN (0)

## 2018-04-25 NOTE — MR AVS SNAPSHOT
After Visit Summary   4/25/2018    Keyur Olivares    MRN: [id number]           Patient Information     Date Of Birth          1980        Visit Information        Provider Department      4/25/2018 8:20 AM Kehr, Kristen M, PA-C [facility]        Today's Diagnoses     Preop general physical exam    -  1    Acute medial meniscus tear of left knee, initial encounter          Care Instructions      Before Your Surgery      Call your surgeon if there is any change in your health. This includes signs of a cold or flu (such as a sore throat, runny nose, cough, rash or fever).    Do not smoke, drink alcohol or take over the counter medicine (unless your surgeon or primary care doctor tells you to) for the 24 hours before and after surgery.    If you take prescribed drugs: Follow your doctor s orders about which medicines to take and which to stop until after surgery.    Eating and drinking prior to surgery: follow the instructions from your surgeon    Take a shower or bath the night before surgery. Use the soap your surgeon gave you to gently clean your skin. If you do not have soap from your surgeon, use your regular soap. Do not shave or scrub the surgery site.  Wear clean pajamas and have clean sheets on your bed.           Follow-ups after your visit        Your next 10 appointments already scheduled     May 03, 2018   Procedure with Dustin Menchaca MD   [facility]    [address] [place] [address] [phone]            May 17, 2018  1:00 PM CDT   Return Visit with Dustin Menchaca MD   [facility] [place] ([facility] [place])    [address]  [place] [address] [phone]              Who to contact     If you have questions or need follow up information about today's clinic visit or your schedule please contact [facility] directly at [phone].  Normal or  non-critical lab and imaging results will be communicated to you by MyChart, letter or phone within 4 business days after the clinic has received the results. If you do not hear from us within 7 days, please contact the clinic through [employer] or phone. If you have a critical or abnormal lab result, we will notify you by phone as soon as possible.  Submit refill requests through [employer] or call your pharmacy and they will forward the refill request to us. Please allow 3 business days for your refill to be completed.          Additional Information About Your Visit        [employer] Information     [employer] gives you secure access to your electronic health record. If you see a primary care provider, you can also send messages to your care team and make appointments. If you have questions, please call your primary care clinic.  If you do not have a primary care provider, please call [phone] and they will assist you.        Care EveryWhere ID     This is your Care EveryWhere ID. This could be used by other organizations to access your [facility] medical records  [id number]        Your Vitals Were     Pulse Temperature Respirations Pulse Oximetry BMI (Body Mass Index)       61 97.7  F (36.5  C) (Oral) 16 99% 29.02 kg/m2        Blood Pressure from Last 3 Encounters:   04/25/18 121/81   04/19/18 137/84   04/09/18 134/88    Weight from Last 3 Encounters:   04/25/18 214 lb (97.1 kg)   04/19/18 216 lb 9.6 oz (98.2 kg)   04/09/18 215 lb (97.5 kg)              Today, you had the following     No orders found for display       Primary Care Provider Fax #    Physician No Ref-Primary [phone]       No address on file        Equal Access to Services     [facility]OBERTO [facility] : Hadii zulay Robledo, wataqueriada juan, qaybatif lundalhussein londono. So [facility] [place] [phone].    ATENCIÓN: Si habla español, tiene a sellers disposición servicios gratuitos de asistencia lingüística. Llame al  [phone].    We comply with applicable federal civil rights laws and [place] laws. We do not discriminate on the basis of race, color, national origin, age, disability, sex, sexual orientation, or gender identity.            Thank you!     Thank you for choosing [facility]  [facility]. Our goal is always to provide you with excellent care. Hearing back from our patients is one way we can continue to improve our services. Please take a few minutes to complete the written survey that you may receive in the mail after your visit with us. Thank you!             Your Updated Medication List - Protect others around you: Learn how to safely use, store and throw away your medicines at www.disposemymeds.org.      Notice  As of 4/25/2018  9:47 AM    You have not been prescribed any medications.

## 2018-04-25 NOTE — PROGRESS NOTES
[facility]  [address] [place] [address] [phone]  Dept: [phone]    PRE-OP EVALUATION:  Today's date: 2018    Keyur Olivares (: 1980) presents for pre-operative evaluation assessment as requested by Dustin Branch.  He requires evaluation and anesthesia risk assessment prior to undergoing surgery/procedure for treatment of left knee .    Fax number for surgical facility:   Primary Physician: No Ref-Primary, Physician  Type of Anesthesia Anticipated: to be determined    Patient has a Health Care Directive or Living Will:  NO    Preop Questions 2018   What are you having done? knee surgery   Date of Surgery/Procedure: may 3rd   Facility or Hospital where procedure/surgery [facility] be performed: [facility]   1.  Do you have a history of Heart attack, stroke, stent, coronary bypass surgery, or other heart surgery? No   2.  Do you ever have any pain or discomfort in your chest? No   3.  Do you have a history of  Heart Failure? No   4.   Are you troubled by shortness of breath when:  walking on a level surface, or up a slight hill, or at night? No   5.  Do you currently have a cold, bronchitis or other respiratory infection? No   6.  Do you have a cough, shortness of breath, or wheezing? No   7.  Do you sometimes get pains in the calves of your legs when you walk? No   8. Do you or anyone in your family have previous history of blood clots? No   9.  Do you or does anyone in your family have a serious bleeding problem such as prolonged bleeding following surgeries or cuts? No   10. Have you ever had problems with anemia or been told to take iron pills? No   11. Have you had any abnormal blood loss such as black, tarry or bloody stools? No   12. Have you ever had a blood transfusion? No   13. Have you or any of your relatives ever had problems with anesthesia? No   14. Do you have sleep apnea, excessive snoring or daytime drowsiness? No   15. Do you have any  prosthetic heart valves? No   16. Do you have prosthetic joints? No         HPI:     HPI related to upcoming procedure: left knee meniscal tear      No chronic medical conditions.     MEDICAL HISTORY:     Patient Active Problem List    Diagnosis Date Noted     ED (erectile dysfunction) 08/06/2015     Priority: Medium     CARDIOVASCULAR SCREENING; LDL GOAL LESS THAN 160 03/25/2013     Priority: Medium      Past Medical History:   Diagnosis Date     Environmental allergies      Past Surgical History:   Procedure Laterality Date     ORTHOPEDIC SURGERY  1997    L Shoulder reconstruction     No current outpatient prescriptions on file.     OTC products: None, except as noted above    No Known Allergies   Latex Allergy: NO    Social History   Substance Use Topics     Smoking status: Current Some Day Smoker     Last attempt to quit: 4/15/2014     Smokeless tobacco: Current User     Types: Chew     Alcohol use Yes      Comment: once weekly     History   Drug Use No       REVIEW OF SYSTEMS:   CONSTITUTIONAL: NEGATIVE for fever, chills, change in weight  INTEGUMENTARY/SKIN: NEGATIVE for worrisome rashes, moles or lesions  EYES: NEGATIVE for vision changes or irritation  ENT/MOUTH: NEGATIVE for ear, mouth and throat problems  RESP: NEGATIVE for significant cough or SOB  CV: NEGATIVE for chest pain, palpitations or peripheral edema  GI: NEGATIVE for nausea, abdominal pain, heartburn, or change in bowel habits  : NEGATIVE for frequency, dysuria, or hematuria  NEURO: NEGATIVE for weakness, dizziness or paresthesias  ENDOCRINE: NEGATIVE for temperature intolerance, skin/hair changes  HEME: NEGATIVE for bleeding problems  PSYCHIATRIC: NEGATIVE for changes in mood or affect    EXAM:   /81  Pulse 61  Temp 97.7  F (36.5  C) (Oral)  Resp 16  Wt 214 lb (97.1 kg)  SpO2 99%  BMI 29.02 kg/m2    GENERAL APPEARANCE: healthy, alert and no distress     EYES: EOMI,  PERRL     HENT: ear canals and TM's normal and nose and mouth  without ulcers or lesions     NECK: no adenopathy, no asymmetry, masses, or scars and thyroid normal to palpation     RESP: lungs clear to auscultation - no rales, rhonchi or wheezes     CV: regular rates and rhythm, normal S1 S2, no S3 or S4 and no murmur, click or rub     ABDOMEN:  soft, nontender, no HSM or masses and bowel sounds normal     MS: extremities normal- no gross deformities noted, no evidence of inflammation in joints, FROM in all extremities.     SKIN: no suspicious lesions or rashes     NEURO: Normal strength and tone, sensory exam grossly normal, mentation intact and speech normal     PSYCH: mentation appears normal. and affect normal/bright     LYMPHATICS: No cervical adenopathy    DIAGNOSTICS:   No labs or EKG required for low risk surgery (cataract, skin procedure, breast biopsy, etc)    Recent Labs   Lab Test  03/25/13   1550   NA  140   POTASSIUM  3.7   CR  0.75        IMPRESSION:   Reason for surgery/procedure: acute medial meniscus tear of left knee  Diagnosis/reason for consult: preoperative clearance    The proposed surgical procedure is considered LOW risk.    REVISED CARDIAC RISK INDEX  The patient has the following serious cardiovascular risks for perioperative complications such as (MI, PE, VFib and 3  AV Block):  No serious cardiac risks  INTERPRETATION: 0 risks: Class I (very low risk - 0.4% complication rate)    The patient has the following additional risks for perioperative complications:  No identified additional risks      ICD-10-CM    1. Preop general physical exam Z01.818    2. Acute medial meniscus tear of left knee, initial encounter S83.242A        RECOMMENDATIONS:         --Patient is to take all scheduled medications on the day of surgery EXCEPT for modifications listed below.  No ASA or NSAIDS    APPROVAL GIVEN to proceed with proposed procedure, without further diagnostic evaluation       Signed Electronically by: Kristen M. Kehr, PA-C  Chart reviewed, agree with  evaluation and recommendations above.  Jenn Tejada M.D.       Copy of this evaluation report is provided to requesting physician.    [facility] Preop Guidelines    Revised Cardiac Risk Index

## 2018-04-25 NOTE — NURSING NOTE
Chief Complaint   Patient presents with     Pre-Op Exam     Health Maintenance     lipids       Initial /81  Pulse 61  Temp 97.7  F (36.5  C) (Oral)  Resp 16  Wt 214 lb (97.1 kg)  SpO2 99%  BMI 29.02 kg/m2 Estimated body mass index is 29.02 kg/(m^2) as calculated from the following:    Height as of 4/19/18: 6' (1.829 m).    Weight as of this encounter: 214 lb (97.1 kg).  Medication Reconciliation: kody Vidal MA

## 2018-05-02 ENCOUNTER — ANESTHESIA EVENT (OUTPATIENT)
Dept: SURGERY | Facility: AMBULATORY SURGERY CENTER | Age: 38
End: 2018-05-02

## 2018-05-02 NOTE — ANESTHESIA PREPROCEDURE EVALUATION
Physical Exam  Normal systems: cardiovascular and dental    Airway   Mallampati: I  TM distance: >3 FB  Neck ROM: full    Dental     Cardiovascular   Rhythm and rate: regular and normal      Pulmonary    breath sounds clear to auscultation                    Anesthesia Plan      History & Physical Review  History and physical reviewed and following examination; no interval change.    ASA Status:  2 .    NPO Status:  > 8 hours    Plan for General and LMA with Intravenous and Propofol induction. Maintenance will be TIVA.    PONV prophylaxis:  Ondansetron (or other 5HT-3) and Dexamethasone or Solumedrol       Postoperative Care  Postoperative pain management:  IV analgesics, Oral pain medications and Multi-modal analgesia.      Consents  Anesthetic plan, risks, benefits and alternatives discussed with:  Patient..            ANESTHESIA PREOP EVALUATION    PROCEDURE: Procedure(s):  Left knee arthroscopy, medial meniscus debridement vs. repair.  Possible chondral debridement. - Wound Class:     HPI: Keyur Olivares is a 38 year old male who presents for above procedure.    PAST MEDICAL HISTORY:    Past Medical History:   Diagnosis Date     Environmental allergies        PAST SURGICAL HISTORY:    Past Surgical History:   Procedure Laterality Date     ORTHOPEDIC SURGERY  1997    L Shoulder reconstruction       PAST ANESTHESIA HISTORY:     No personal or family h/o anesthesia problems    SOCIAL HISTORY:       Social History   Substance Use Topics     Smoking status: Current Some Day Smoker     Last attempt to quit: 4/15/2014     Smokeless tobacco: Current User     Types: Chew     Alcohol use Yes      Comment: once weekly       ALLERGIES:     No Known Allergies    MEDICATIONS:       (Not in a hospital admission)    Current Outpatient Prescriptions   Medication Sig Dispense Refill     GLUCOSAMINE SULFATE PO Take 500 mg by mouth 2 times daily         Current Outpatient Prescriptions Ordered in Epic   Medication Sig  Dispense Refill     GLUCOSAMINE SULFATE PO Take 500 mg by mouth 2 times daily       No current Epic-ordered facility-administered medications on file.        PHYSICAL EXAM:    Vitals: T Data Unavailable, P Data Unavailable, BP Data Unavailable, R Data Unavailable, SpO2  , Weight Wt Readings from Last 2 Encounters:   04/25/18 97.1 kg (214 lb)   04/19/18 98.2 kg (216 lb 9.6 oz)       See doc flowsheet      No Data Recorded    No Data Recorded    No Data Recorded    No Data Recorded    No Data Recorded    No data recorded.  No data recorded.      NPO STATUS: see doc flowsheet    LABS:    BMP:  Recent Labs   Lab Test  03/25/13   1550   NA  140   POTASSIUM  3.7   CHLORIDE  98   CO2  28   BUN  12   CR  0.75   GLC  88   CHERYL  8.7       LFTs:   No results for input(s): PROTTOTAL, ALBUMIN, BILITOTAL, ALKPHOS, AST, ALT, BILIDIRECT in the last 19085 hours.    CBC:   No results for input(s): WBC, RBC, HGB, HCT, MCV, MCH, MCHC, RDW, PLT in the last 29832 hours.    Coags:  No results for input(s): INR, PTT, FIBR in the last 99030 hours.    Imaging:  No orders to display       Mani Treviño MD  Anesthesiology Staff  Pager [phone]    5/2/2018  12:22 AM                  .

## 2018-05-03 ENCOUNTER — ANESTHESIA (OUTPATIENT)
Dept: SURGERY | Facility: AMBULATORY SURGERY CENTER | Age: 38
End: 2018-05-03
Payer: COMMERCIAL

## 2018-05-03 ENCOUNTER — HOSPITAL ENCOUNTER (OUTPATIENT)
Facility: AMBULATORY SURGERY CENTER | Age: 38
Discharge: HOME | End: 2018-05-03
Attending: ORTHOPAEDIC SURGERY | Admitting: ORTHOPAEDIC SURGERY
Payer: COMMERCIAL

## 2018-05-03 VITALS
SYSTOLIC BLOOD PRESSURE: 128 MMHG | OXYGEN SATURATION: 100 % | TEMPERATURE: 97.3 F | DIASTOLIC BLOOD PRESSURE: 90 MMHG | RESPIRATION RATE: 20 BRPM

## 2018-05-03 DIAGNOSIS — M23.322 MEDIAL MENISCUS, POSTERIOR HORN DERANGEMENT, LEFT: Primary | ICD-10-CM

## 2018-05-03 PROCEDURE — G8916 PT W IV AB GIVEN ON TIME: HCPCS

## 2018-05-03 PROCEDURE — 29881 ARTHRS KNE SRG MNISECTMY M/L: CPT | Mod: LT | Performed by: ORTHOPAEDIC SURGERY

## 2018-05-03 PROCEDURE — G8907 PT DOC NO EVENTS ON DISCHARG: HCPCS

## 2018-05-03 PROCEDURE — 29881 ARTHRS KNE SRG MNISECTMY M/L: CPT | Mod: LT

## 2018-05-03 RX ORDER — CEFAZOLIN SODIUM 1 G/3ML
1 INJECTION, POWDER, FOR SOLUTION INTRAMUSCULAR; INTRAVENOUS SEE ADMIN INSTRUCTIONS
Status: DISCONTINUED | OUTPATIENT
Start: 2018-05-03 | End: 2018-05-04 | Stop reason: HOSPADM

## 2018-05-03 RX ORDER — CEFAZOLIN SODIUM 2 G/100ML
2 INJECTION, SOLUTION INTRAVENOUS
Status: COMPLETED | OUTPATIENT
Start: 2018-05-03 | End: 2018-05-03

## 2018-05-03 RX ORDER — DEXAMETHASONE SODIUM PHOSPHATE 4 MG/ML
INJECTION, SOLUTION INTRA-ARTICULAR; INTRALESIONAL; INTRAMUSCULAR; INTRAVENOUS; SOFT TISSUE PRN
Status: DISCONTINUED | OUTPATIENT
Start: 2018-05-03 | End: 2018-05-03

## 2018-05-03 RX ORDER — NALOXONE HYDROCHLORIDE 0.4 MG/ML
.1-.4 INJECTION, SOLUTION INTRAMUSCULAR; INTRAVENOUS; SUBCUTANEOUS
Status: DISCONTINUED | OUTPATIENT
Start: 2018-05-03 | End: 2018-05-04 | Stop reason: HOSPADM

## 2018-05-03 RX ORDER — FENTANYL CITRATE 50 UG/ML
INJECTION, SOLUTION INTRAMUSCULAR; INTRAVENOUS PRN
Status: DISCONTINUED | OUTPATIENT
Start: 2018-05-03 | End: 2018-05-03

## 2018-05-03 RX ORDER — HYDROCODONE BITARTRATE AND ACETAMINOPHEN 5; 325 MG/1; MG/1
2 TABLET ORAL
Status: DISCONTINUED | OUTPATIENT
Start: 2018-05-03 | End: 2018-05-04 | Stop reason: HOSPADM

## 2018-05-03 RX ORDER — ONDANSETRON 4 MG/1
4 TABLET, ORALLY DISINTEGRATING ORAL
Status: DISCONTINUED | OUTPATIENT
Start: 2018-05-03 | End: 2018-05-04 | Stop reason: HOSPADM

## 2018-05-03 RX ORDER — GABAPENTIN 300 MG/1
300 CAPSULE ORAL ONCE
Status: COMPLETED | OUTPATIENT
Start: 2018-05-03 | End: 2018-05-03

## 2018-05-03 RX ORDER — BUPIVACAINE HYDROCHLORIDE 2.5 MG/ML
INJECTION, SOLUTION INFILTRATION; PERINEURAL PRN
Status: DISCONTINUED | OUTPATIENT
Start: 2018-05-03 | End: 2018-05-03 | Stop reason: HOSPADM

## 2018-05-03 RX ORDER — AMOXICILLIN 250 MG
1-2 CAPSULE ORAL 2 TIMES DAILY
Qty: 30 TABLET | Refills: 0 | Status: SHIPPED | OUTPATIENT
Start: 2018-05-03 | End: 2018-05-17

## 2018-05-03 RX ORDER — KETOROLAC TROMETHAMINE 30 MG/ML
INJECTION, SOLUTION INTRAMUSCULAR; INTRAVENOUS PRN
Status: DISCONTINUED | OUTPATIENT
Start: 2018-05-03 | End: 2018-05-03

## 2018-05-03 RX ORDER — HYDROXYZINE HYDROCHLORIDE 25 MG/1
25 TABLET, FILM COATED ORAL
Status: DISCONTINUED | OUTPATIENT
Start: 2018-05-03 | End: 2018-05-04 | Stop reason: HOSPADM

## 2018-05-03 RX ORDER — HYDROCODONE BITARTRATE AND ACETAMINOPHEN 5; 325 MG/1; MG/1
1-2 TABLET ORAL EVERY 4 HOURS PRN
Qty: 30 TABLET | Refills: 0 | Status: SHIPPED | OUTPATIENT
Start: 2018-05-03 | End: 2018-05-17

## 2018-05-03 RX ORDER — ONDANSETRON 4 MG/1
4 TABLET, ORALLY DISINTEGRATING ORAL EVERY 30 MIN PRN
Status: DISCONTINUED | OUTPATIENT
Start: 2018-05-03 | End: 2018-05-04 | Stop reason: HOSPADM

## 2018-05-03 RX ORDER — HYDROMORPHONE HYDROCHLORIDE 1 MG/ML
.3-.5 INJECTION, SOLUTION INTRAMUSCULAR; INTRAVENOUS; SUBCUTANEOUS EVERY 10 MIN PRN
Status: DISCONTINUED | OUTPATIENT
Start: 2018-05-03 | End: 2018-05-04 | Stop reason: HOSPADM

## 2018-05-03 RX ORDER — LIDOCAINE HYDROCHLORIDE 20 MG/ML
INJECTION, SOLUTION INFILTRATION; PERINEURAL PRN
Status: DISCONTINUED | OUTPATIENT
Start: 2018-05-03 | End: 2018-05-03

## 2018-05-03 RX ORDER — PROPOFOL 10 MG/ML
INJECTION, EMULSION INTRAVENOUS PRN
Status: DISCONTINUED | OUTPATIENT
Start: 2018-05-03 | End: 2018-05-03

## 2018-05-03 RX ORDER — METHOCARBAMOL 750 MG/1
750 TABLET, FILM COATED ORAL
Status: DISCONTINUED | OUTPATIENT
Start: 2018-05-03 | End: 2018-05-04 | Stop reason: HOSPADM

## 2018-05-03 RX ORDER — SODIUM CHLORIDE, SODIUM LACTATE, POTASSIUM CHLORIDE, CALCIUM CHLORIDE 600; 310; 30; 20 MG/100ML; MG/100ML; MG/100ML; MG/100ML
INJECTION, SOLUTION INTRAVENOUS CONTINUOUS
Status: DISCONTINUED | OUTPATIENT
Start: 2018-05-03 | End: 2018-05-04 | Stop reason: HOSPADM

## 2018-05-03 RX ORDER — ACETAMINOPHEN 325 MG/1
975 TABLET ORAL ONCE
Status: COMPLETED | OUTPATIENT
Start: 2018-05-03 | End: 2018-05-03

## 2018-05-03 RX ORDER — ONDANSETRON 2 MG/ML
4 INJECTION INTRAMUSCULAR; INTRAVENOUS EVERY 30 MIN PRN
Status: DISCONTINUED | OUTPATIENT
Start: 2018-05-03 | End: 2018-05-04 | Stop reason: HOSPADM

## 2018-05-03 RX ORDER — FENTANYL CITRATE 50 UG/ML
25-50 INJECTION, SOLUTION INTRAMUSCULAR; INTRAVENOUS
Status: DISCONTINUED | OUTPATIENT
Start: 2018-05-03 | End: 2018-05-04 | Stop reason: HOSPADM

## 2018-05-03 RX ORDER — ONDANSETRON 2 MG/ML
INJECTION INTRAMUSCULAR; INTRAVENOUS PRN
Status: DISCONTINUED | OUTPATIENT
Start: 2018-05-03 | End: 2018-05-03

## 2018-05-03 RX ORDER — GLYCOPYRROLATE 0.2 MG/ML
INJECTION, SOLUTION INTRAMUSCULAR; INTRAVENOUS PRN
Status: DISCONTINUED | OUTPATIENT
Start: 2018-05-03 | End: 2018-05-03

## 2018-05-03 RX ORDER — MEPERIDINE HYDROCHLORIDE 25 MG/ML
12.5 INJECTION INTRAMUSCULAR; INTRAVENOUS; SUBCUTANEOUS
Status: DISCONTINUED | OUTPATIENT
Start: 2018-05-03 | End: 2018-05-04 | Stop reason: HOSPADM

## 2018-05-03 RX ADMIN — KETOROLAC TROMETHAMINE 30 MG: 30 INJECTION, SOLUTION INTRAMUSCULAR; INTRAVENOUS at 07:47

## 2018-05-03 RX ADMIN — PROPOFOL 300 MG: 10 INJECTION, EMULSION INTRAVENOUS at 07:07

## 2018-05-03 RX ADMIN — PROPOFOL 100 MG: 10 INJECTION, EMULSION INTRAVENOUS at 07:21

## 2018-05-03 RX ADMIN — SODIUM CHLORIDE, SODIUM LACTATE, POTASSIUM CHLORIDE, CALCIUM CHLORIDE: 600; 310; 30; 20 INJECTION, SOLUTION INTRAVENOUS at 06:28

## 2018-05-03 RX ADMIN — CEFAZOLIN SODIUM 2 G: 2 INJECTION, SOLUTION INTRAVENOUS at 06:59

## 2018-05-03 RX ADMIN — DEXAMETHASONE SODIUM PHOSPHATE 4 MG: 4 INJECTION, SOLUTION INTRA-ARTICULAR; INTRALESIONAL; INTRAMUSCULAR; INTRAVENOUS; SOFT TISSUE at 06:59

## 2018-05-03 RX ADMIN — LIDOCAINE HYDROCHLORIDE 40 MG: 20 INJECTION, SOLUTION INFILTRATION; PERINEURAL at 07:07

## 2018-05-03 RX ADMIN — ACETAMINOPHEN 975 MG: 325 TABLET ORAL at 06:27

## 2018-05-03 RX ADMIN — FENTANYL CITRATE 100 MCG: 50 INJECTION, SOLUTION INTRAMUSCULAR; INTRAVENOUS at 06:59

## 2018-05-03 RX ADMIN — GLYCOPYRROLATE 0.2 MG: 0.2 INJECTION, SOLUTION INTRAMUSCULAR; INTRAVENOUS at 07:03

## 2018-05-03 RX ADMIN — FENTANYL CITRATE 50 MCG: 50 INJECTION, SOLUTION INTRAMUSCULAR; INTRAVENOUS at 07:29

## 2018-05-03 RX ADMIN — ONDANSETRON 4 MG: 2 INJECTION INTRAMUSCULAR; INTRAVENOUS at 06:59

## 2018-05-03 RX ADMIN — FENTANYL CITRATE 50 MCG: 50 INJECTION, SOLUTION INTRAMUSCULAR; INTRAVENOUS at 07:32

## 2018-05-03 RX ADMIN — GABAPENTIN 300 MG: 300 CAPSULE ORAL at 06:28

## 2018-05-03 NOTE — ANESTHESIA CARE TRANSFER NOTE
Patient: Keyur Olivares    Procedure(s):  Left knee arthroscopy, medial meniscus debridement - Wound Class: I-Clean    Diagnosis: Left knee medial meniscus tear  Diagnosis Additional Information: No value filed.    Anesthesia Type:   General, LMA     Note:  Airway :Room Air  Patient transferred to:PACU  Handoff Report: Identifed the Patient, Identified the Reponsible Provider, Reviewed the pertinent medical history, Discussed the surgical course, Reviewed Intra-OP anesthesia mangement and issues during anesthesia, Set expectations for post-procedure period and Allowed opportunity for questions and acknowledgement of understanding      Vitals: (Last set prior to Anesthesia Care Transfer)    CRNA VITALS  5/3/2018 0724 - 5/3/2018 0801      5/3/2018             Pulse: 53    Temp: 98.6  F (37  C)    SpO2: 99 %    EKG: Sinus rhythm                Electronically Signed By: EDUARDO Donohue CRNA  May 3, 2018  8:01 AM

## 2018-05-03 NOTE — IP AVS SNAPSHOT
[facility] [place]    [address] [place] [address]    Phone:  [phone]                                       After Visit Summary   5/3/2018    Keyur Olivares    MRN: [id number]           After Visit Summary Signature Page     I have received my discharge instructions, and my questions have been answered. I have discussed any challenges I see with this plan with the nurse or doctor.    ..........................................................................................................................................  Patient/Patient Representative Signature      ..........................................................................................................................................  Patient Representative Print Name and Relationship to Patient    ..................................................               ................................................  Date                                            Time    ..........................................................................................................................................  Reviewed by Signature/Title    ...................................................              ..............................................  Date                                                            Time

## 2018-05-03 NOTE — H&P (VIEW-ONLY)
[facility]  [address] [place] [facility] [address] [phone]  Dept: [phone]    PRE-OP EVALUATION:  Today's date: 2018    Keyur Olivares (: 1980) presents for pre-operative evaluation assessment as requested by Dustin Branch.  He requires evaluation and anesthesia risk assessment prior to undergoing surgery/procedure for treatment of left knee .    Fax number for surgical facility:   Primary Physician: No Ref-Primary, Physician  Type of Anesthesia Anticipated: to be determined    Patient has a Health Care Directive or Living Will:  NO    Preop Questions 2018   What are you having done? knee surgery   Date of Surgery/Procedure: may 3rd   Facility or Hospital where procedure/surgery will be performed: [facility]   1.  Do you have a history of Heart attack, stroke, stent, coronary bypass surgery, or other heart surgery? No   2.  Do you ever have any pain or discomfort in your chest? No   3.  Do you have a history of  Heart Failure? No   4.   Are you troubled by shortness of breath when:  walking on a level surface, or up a slight hill, or at night? No   5.  Do you currently have a cold, bronchitis or other respiratory infection? No   6.  Do you have a cough, shortness of breath, or wheezing? No   7.  Do you sometimes get pains in the calves of your legs when you walk? No   8. Do you or anyone in your family have previous history of blood clots? No   9.  Do you or does anyone in your family have a serious bleeding problem such as prolonged bleeding following surgeries or cuts? No   10. Have you ever had problems with anemia or been told to take iron pills? No   11. Have you had any abnormal blood loss such as black, tarry or bloody stools? No   12. Have you ever had a blood transfusion? No   13. Have you or any of your relatives ever had problems with anesthesia? No   14. Do you have sleep apnea, excessive snoring or daytime drowsiness? No   15. Do you have any  prosthetic heart valves? No   16. Do you have prosthetic joints? No         HPI:     HPI related to upcoming procedure: left knee meniscal tear      No chronic medical conditions.     MEDICAL HISTORY:     Patient Active Problem List    Diagnosis Date Noted     ED (erectile dysfunction) 08/06/2015     Priority: Medium     CARDIOVASCULAR SCREENING; LDL GOAL LESS THAN 160 03/25/2013     Priority: Medium      Past Medical History:   Diagnosis Date     Environmental allergies      Past Surgical History:   Procedure Laterality Date     ORTHOPEDIC SURGERY  1997    L Shoulder reconstruction     No current outpatient prescriptions on file.     OTC products: None, except as noted above    No Known Allergies   Latex Allergy: NO    Social History   Substance Use Topics     Smoking status: Current Some Day Smoker     Last attempt to quit: 4/15/2014     Smokeless tobacco: Current User     Types: Chew     Alcohol use Yes      Comment: once weekly     History   Drug Use No       REVIEW OF SYSTEMS:   CONSTITUTIONAL: NEGATIVE for fever, chills, change in weight  INTEGUMENTARY/SKIN: NEGATIVE for worrisome rashes, moles or lesions  EYES: NEGATIVE for vision changes or irritation  ENT/MOUTH: NEGATIVE for ear, mouth and throat problems  RESP: NEGATIVE for significant cough or SOB  CV: NEGATIVE for chest pain, palpitations or peripheral edema  GI: NEGATIVE for nausea, abdominal pain, heartburn, or change in bowel habits  : NEGATIVE for frequency, dysuria, or hematuria  NEURO: NEGATIVE for weakness, dizziness or paresthesias  ENDOCRINE: NEGATIVE for temperature intolerance, skin/hair changes  HEME: NEGATIVE for bleeding problems  PSYCHIATRIC: NEGATIVE for changes in mood or affect    EXAM:   /81  Pulse 61  Temp 97.7  F (36.5  C) (Oral)  Resp 16  Wt 214 lb (97.1 kg)  SpO2 99%  BMI 29.02 kg/m2    GENERAL APPEARANCE: healthy, alert and no distress     EYES: EOMI,  PERRL     HENT: ear canals and TM's normal and nose and mouth  without ulcers or lesions     NECK: no adenopathy, no asymmetry, masses, or scars and thyroid normal to palpation     RESP: lungs clear to auscultation - no rales, rhonchi or wheezes     CV: regular rates and rhythm, normal S1 S2, no S3 or S4 and no murmur, click or rub     ABDOMEN:  soft, nontender, no HSM or masses and bowel sounds normal     MS: extremities normal- no gross deformities noted, no evidence of inflammation in joints, FROM in all extremities.     SKIN: no suspicious lesions or rashes     NEURO: Normal strength and tone, sensory exam grossly normal, mentation intact and speech normal     PSYCH: mentation appears normal. and affect normal/bright     LYMPHATICS: No cervical adenopathy    DIAGNOSTICS:   No labs or EKG required for low risk surgery (cataract, skin procedure, breast biopsy, etc)    Recent Labs   Lab Test  03/25/13   1550   NA  140   POTASSIUM  3.7   CR  0.75        IMPRESSION:   Reason for surgery/procedure: acute medial meniscus tear of left knee  Diagnosis/reason for consult: preoperative clearance    The proposed surgical procedure is considered LOW risk.    REVISED CARDIAC RISK INDEX  The patient has the following serious cardiovascular risks for perioperative complications such as (MI, PE, VFib and 3  AV Block):  No serious cardiac risks  INTERPRETATION: 0 risks: Class I (very low risk - 0.4% complication rate)    The patient has the following additional risks for perioperative complications:  No identified additional risks      ICD-10-CM    1. Preop general physical exam Z01.818    2. Acute medial meniscus tear of left knee, initial encounter S83.242A        RECOMMENDATIONS:         --Patient is to take all scheduled medications on the day of surgery EXCEPT for modifications listed below.  No ASA or NSAIDS    APPROVAL GIVEN to proceed with proposed procedure, without further diagnostic evaluation       Signed Electronically by: Kristen M. Kehr, PA-C  Chart reviewed, agree with  evaluation and recommendations above.  Jenn Tejada M.D.       Copy of this evaluation report is provided to requesting physician.    [facility] Preop Guidelines    Revised Cardiac Risk Index

## 2018-05-03 NOTE — ANESTHESIA POSTPROCEDURE EVALUATION
Patient: Keyur Olivares    Procedure(s):  Left knee arthroscopy, medial meniscus debridement - Wound Class: I-Clean    Diagnosis:Left knee medial meniscus tear  Diagnosis Additional Information: No value filed.    Anesthesia Type:  General, LMA    Note:  Anesthesia Post Evaluation    Patient location during evaluation: PACU  Patient participation: Able to fully participate in evaluation  Level of consciousness: awake and alert  Pain management: adequate  Airway patency: patent  Cardiovascular status: acceptable  Respiratory status: acceptable  Hydration status: acceptable  PONV: none     Anesthetic complications: None          Last vitals:  Vitals:    05/03/18 0830 05/03/18 0835 05/03/18 0840   BP: (!) 129/97 (!) 129/96 128/90   Resp:  20 20   Temp:   97.3  F (36.3  C)   SpO2: 98% 97% 100%         Electronically Signed By: Mani Treviño MD  May 3, 2018

## 2018-05-03 NOTE — INTERVAL H&P NOTE
"The History and Physical on patient's chart was personally reviewed today with the patient. there have been no interval changes in patient's history since H+P performed.    History:  Keyur \"OLGA\" ROCKY Olivares is a 38 year old male seen with ongoing left knee pain with no known injury. Pain has been present for 4 months, since January 2018. He presents today with severe pain, rated a 6/10. Pain is located over the medial lateral and posterior knee. Pain is worsened with stairs. He has noticed swelling in the knee with activities. For treatment, he takes ibuprofen 800 mg daily and uses the brace. He does not recall any increase or change in activities that may have caused knee pain. No history of knee problems. Denies low back or hip problems.      Present symptoms: severe pain, + locking and catching, + swelling.      X-RAY:  Bilateral king, bilateral sunrise and lateral left knee from 4/9/2018: no obvious fractures or dislocations. Effusion.        MRI:  MRI left knee from 4/19/2018 was reviewed in clinic today.    IMPRESSION:    1. Medial meniscus posterior horn horizontal tear with suspected small meniscal  flap far posteriorly adjacent to the posterior cruciate ligament.  2. Septated cyst likely representing a small ganglion cyst between the  ACL and posterior cruciate ligament. The cruciate ligaments appear  intact.  3. Joint effusion.      Impression:   38 year old male with acute left knee pain, medial meniscus tear, chondromalacia.     Plan:   * discussed injury with patient, what appears to be a left knee medial meniscal tear on MRI, as well as some underlying mild chondromalacia changes, which is consistent with symptoms and physical examination findings.      * Discussed treatment options including nonoperative treatment with continued rest, ice, elevation, activity modification, NSAIDS and Physical Therapy, bracing and potential injections versus surgical treatment with arthroscopy and meniscal " repair versus debridement. Risks and benefits of each discussed in detail.  * in the setting of underlying chondrosis, predictability of arthroscopy is uncertain, unless mechanical symptoms present due to the meniscus tear.     * surgical risks discussed: bleeding, infection, pain, scar, damage to adjacent structures (nerve, vessels, cartilage), stiffness, post-traumatic arthritis, failure to relieve symptoms, recurrence of symptoms, blood clots (DVT), pulmonary emolism, risks of anesthesia and death. This surgery is not intended nor expected to alleviate arthritic pain symptoms, nor will it treat or correct underlying arthritic changes. Arthritis and symptoms related to arthritis could worsen with arthroscopy and meniscal debridement. Patient understands.     * understanding the risks of surgery, patient elected to proceed with arthroscopy    * plan: left knee arthroscopy with partial meniscal debridement versus repair, possible chondral debridement, outpatient surgery      Risks and perceived benefits of surgery again discussed with patient. Patient's questions addressed and answered. Written informed consent obtained and reviewed. Surgical site marked with indelible marker with patient's participation after confirming site with patient.      Dustin Menchaca M.D., M.S.  Dept. of Orthopaedic Surgery  [facility]

## 2018-05-03 NOTE — BRIEF OP NOTE
POST OPERATIVE NOTE-IMMEDIATE :    Date of surgery: 5/3/2018    Preoperative Diagnosis:  Left knee medial meniscus tear    Postoperative Diagnosis:  Left knee medial meniscus tear    Procedures:  Procedure(s):  ARTHROSCOPY KNEE, Left  Medial meniscus debridement  Medial plica excision    Prosthetic Devices: See Op Note    Surgeon(s) and Assistants (if any):  Attending Surgeon: Dustin Menchaca MD, MS  Assistant: Cleveland Adams PA-C    Anesthesia:  General    Antibiotics: 2g Ancef    IV Fluids: 1 liter LR    UOP: 0, no kendall    Drains: none    Specimens: none    Complications: None apparent.    Tourniquet Time: 20 minutes @ 300mmHg    Findings/Conclusions: complex medial meniscus tear (radial tear with horizontal cleavage). Cystic changes to anterior cruciate ligament. See Op Note for further detail.    Estimated Blood Loss: 1ml    Post Op Plan:  *Rest   *Ice   *Elevation   *Weight bearing as tolerated, crutches as needed   *oral pain medications  *Daily asa x2 weeks  *Home exercise program   *Return to clinic 2 weeks for wound check, suture removal, sooner if needed      Cleveland Adams PA-C, CAQ (Ortho)  Supervising Physician: Dustin Menchaca M.D., M.S.  Dept. of Orthopaedic Surgery  [facility]

## 2018-05-03 NOTE — OP NOTE
Procedure Date: 05/03/2018      PREOPERATIVE DIAGNOSIS:  Left knee medial meniscus tear and chondromalacia.      POSTOPERATIVE DIAGNOSES:  Left knee medial meniscus tear and chondromalacia.      PROCEDURES:   1.  Left knee arthroscopic partial medial meniscectomy.   2.  Left knee arthroscopic medial plica resection.      ATTENDING SURGEON:  Dustin Menchaca MD      ASSISTANT:  Cleveland Adams PA-C      ANESTHESIA:  General in the supine position.      INTRAVENOUS FLUIDS:  1  liter lactated Ringer's.      URINE OUTPUT:  0, no [place].      ESTIMATED BLOOD LOSS:  1 mL.      ANTIBIOTICS:  Cefazolin 2 grams today prior to incision and tourniquet inflation.      TOURNIQUET TIME:  20 minutes at 300 mmHg of the left upper thigh.      COMPLICATIONS:  None apparent.      IMPLANTS:  None.      SPECIMENS:  None.      DRAINS:  None.      FINDINGS:  A complex tear of the posterior horn medial meniscus with displaced meniscal flap, horizontal cleavage tear and a small radial tear.  Prominent medial plica.  Trace effusion.  Mild synovitis.  Intact patellofemoral, medial and lateral articular surfaces.  A cystic degeneration of the ACL with a cyst at the ACL tibial footprint.      INDICATIONS FOR PROCEDURE:  Keyur Olivares is a 38-year-old gentleman with left knee pain with no specific injury.  Pain has been present since 01/2018.  Pain rated a 6/10 over the medial, lateral and posterior knee, mostly aggravated with stairs and weightbearing activities.  He has had swelling in his knee with activities.  He has been taking ibuprofen and using a brace.  Denies prior problems.  He has had some locking and catching in the knee as well.  He has done antiinflammatories and activity modification as well.  He was seen in sports medicine, referred for an MRI showing a meniscal tear as well as a ganglion cyst between the ACL and PCL and effusion.  He was referred for surgical evaluation.  We discussed these imaging findings and exam findings  consistent with a medial meniscus tear as well as some mild chondromalacia.  Treatment options discussed including nonsurgical versus surgical management.  Surgical risks discussed included but not limited to bleeding, infection, pain, scar, damage to adjacent structures including nerves, vessels and cartilage, stiffness, arthritis, failure to relieve symptoms, recurrence of symptoms, worsening of symptoms, blood clots, pulmonary embolism, risks of anesthesia and death.  Surgery is not intended or expected to alleviate arthritic pain symptoms, nor treat or correct underlying arthritic changes.  Arthritis and symptoms related to arthritis could worsen with arthroscopy and meniscal debridement or repair and chondral debridement.  Despite these risks, as a quality of life decision, he elected to proceed.      DETAILS OF PROCEDURE:  The patient was identified in the preoperative holding area.  After confirming with the patient the correct procedure and procedure site, the left knee was marked with an indelible marker by myself, the attending surgeon.  After again reviewing risks and perceived benefits of surgery, questions were addressed and he elected to proceed.  Written informed consent was obtained and reviewed.  The patient was then taken to the operating room, placed supine on the operating table.  All bony prominences well padded.  He was adequately secured .  After adequate general anesthesia, a nonsterile tourniquet was placed on left upper thigh.  Left lower extremity was prepped and draped in the usual sterile fashion.  Timeout was then performed confirming the correct patient, procedure, procedure site, availability of instruments, review of the patient's allergies as well as the administration of prophylactic antibiotics by operating staff.      At this time, left lower extremity was elevated, exsanguinated with an Esmarch and tourniquet inflated.  Standard anterolateral arthroscopy portal was made.  Upon  his suprapatellar pouch, a trace effusion was evacuated.  Mild synovitis.  No loose bodies in the medial or lateral gutters.  Prominent medial plica.  Patellofemoral articular surfaces grossly intact.  Upon entering the notch, the ACL appeared to be thickened with a somewhat abnormal appearance.  It appeared to be a synovial cyst at the base of the ACL at its tibial insertion.  Anteromedial arthroscopy portal was made guided with a spinal interval.  Probe was introduced and upon probing the ACL appeared to be grossly intact.  I did use the oscillating debrider then and resected off the meniscal cyst at the footprint.  The knee was then placed in figure-4 position, evaluating the lateral compartment.  Probing superior and inferior surfaces of lateral meniscus revealed no obvious tear.  Again, intact articular surface.  Medial plica was then resected.  The knee was then placed in gentle valgus stress, could see irregularity of the posterior horn with displaced meniscal flap.        Using a probe, I was able to easily move this meniscal flap in and out of the articular area.  Alternating between an oscillating debrider and meniscal biter, the loose displaced flap was debrided back until this was stable, confirmed with probing.  There was additional small radial tear at the posterior horn body junction, and a horizontal undersurface cleavage tear posteriorly in the posterior horn.  Again, alternating between an oscillating debrider and meniscal biter, this was debrided back until this was stable, confirmed with probing.  I did not resect both the superior and inferior leaflets of the meniscus as this would require resecting majority of the meniscus posteriorly, but did debride this back until it was stable.  Final diagnostic examination of the knee was performed.  There were no remnants or loose cartilage fragments.  I then proceeded back up to suprapatellar pouch and a final lavage of the knee was performed.  Remaining  fluid was drained from the knee and instruments were removed.  Wounds were closed with 3-0 Prolene, injected with 0.25% Marcaine, Steri-Strips, well-padded soft dressing and Ace wrap.  Tourniquet deflated.  He was awakened and taken to recovery in stable condition.        Postoperatively, rest, ice, elevate.  Weightbear as tolerated.  Home exercise program.  Oral pain medications include hydrocodone.  Stool softeners daily.  Aspirin 2 weeks for blood thinning.  We will see him back in 2 weeks' time for wound check, suture removal, sooner if needed.      No apparent complications.         ANGELINE BANDA MD             D: 2018   T: 2018   MT: MARIA A      Name:     PARDEEP KIRKLAND   MRN:      [id number]        Account:        [id number]   :      1980           Procedure Date: 2018      Document: [id number]

## 2018-05-17 ENCOUNTER — OFFICE VISIT (OUTPATIENT)
Dept: ORTHOPEDICS | Facility: CLINIC | Age: 38
End: 2018-05-17
Payer: COMMERCIAL

## 2018-05-17 VITALS
DIASTOLIC BLOOD PRESSURE: 86 MMHG | HEIGHT: 72 IN | HEART RATE: 73 BPM | BODY MASS INDEX: 29.82 KG/M2 | WEIGHT: 220.2 LBS | SYSTOLIC BLOOD PRESSURE: 134 MMHG

## 2018-05-17 DIAGNOSIS — Z98.890 S/P ARTHROSCOPIC SURGERY OF LEFT KNEE: Primary | ICD-10-CM

## 2018-05-17 PROCEDURE — 99024 POSTOP FOLLOW-UP VISIT: CPT | Performed by: ORTHOPAEDIC SURGERY

## 2018-05-17 ASSESSMENT — PAIN SCALES - GENERAL: PAINLEVEL: MILD PAIN (2)

## 2018-05-17 NOTE — MR AVS SNAPSHOT
After Visit Summary   5/17/2018    Keyur Olivares    MRN: [id number]           Patient Information     Date Of Birth          1980        Visit Information        Provider Department      5/17/2018 1:00 PM Dustin Menchaca MD [facility]        Today's Diagnoses     S/P arthroscopic surgery of left knee    -  1       Follow-ups after your visit        Follow-up notes from your care team     Return if symptoms worsen or fail to improve.      Who to contact     If you have questions or need follow up information about today's clinic visit or your schedule please contact [facility] directly at [phone].  Normal or non-critical lab and imaging results will be communicated to you by MyChart, letter or phone within 4 business days after the clinic has received the results. If you do not hear from us within 7 days, please contact the clinic through [employer] or phone. If you have a critical or abnormal lab result, we will notify you by phone as soon as possible.  Submit refill requests through [employer] or call your pharmacy and they will forward the refill request to us. Please allow 3 business days for your refill to be completed.          Additional Information About Your Visit        MyChart Information     [employer] gives you secure access to your electronic health record. If you see a primary care provider, you can also send messages to your care team and make appointments. If you have questions, please call your primary care clinic.  If you do not have a primary care provider, please call [phone] and they will assist you.        Care EveryWhere ID     This is your Care EveryWhere ID. This could be used by other organizations to access your [facility] medical records  [id number]        Your Vitals Were     Pulse Height BMI (Body Mass Index)             73 6' (1.829 m) 29.86 kg/m2          Blood Pressure from Last 3 Encounters:    05/17/18 134/86   05/03/18 128/90   04/25/18 121/81    Weight from Last 3 Encounters:   05/17/18 220 lb 3.2 oz (99.9 kg)   04/25/18 214 lb (97.1 kg)   04/19/18 216 lb 9.6 oz (98.2 kg)              Today, you had the following     No orders found for display       Primary Care Provider Fax #    Physician No Ref-Primary [phone]       No address on file        Equal Access to Services     JENNIFER DUQUE : Hadii aad ku hadasho Soomaali, waaxda luqadaha, qaybta kaalmada adeegyada, waxay pioin hayredn adeeg david man . So [facility] [place] [phone].    ATENCIÓN: Si habla español, tiene a sellers disposición servicios gratuitos de asistencia lingüística. Llame al [phone].    We comply with applicable federal civil rights laws and [place] laws. We do not discriminate on the basis of race, color, national origin, age, disability, sex, sexual orientation, or gender identity.            Thank you!     Thank you for choosing [facility]  for your care. Our goal is always to provide you with excellent care. Hearing back from our patients is one way we can continue to improve our services. Please take a few minutes to complete the written survey that you may receive in the mail after your visit with us. Thank you!             Your Updated Medication List - Protect others around you: Learn how to safely use, store and throw away your medicines at www.disposemymeds.org.          This list is accurate as of 5/17/18  1:32 PM.  Always use your most recent med list.                   Brand Name Dispense Instructions for use Diagnosis    aspirin 325 MG EC tablet     14 tablet    Take 1 tablet (325 mg) by mouth daily for 14 days    Medial meniscus, posterior horn derangement, left       GLUCOSAMINE SULFATE PO      Take 500 mg by mouth 2 times daily

## 2018-05-17 NOTE — PROGRESS NOTES
"Chief Complaint   Patient presents with     Surgical Followup     Left knee scope - partial MM; MPR. DOS 5/3/18, 2 week PO. Patient state his knee is feeling a little sore but he gets around ok. He is back to work full time. He has a helper. He has been doing the exercises, stairs are a little difficult.        SURGERY: left knee arthroscopy. ( [facility] )  1.  Left knee arthroscopic partial medial meniscectomy.   2.  Left knee arthroscopic medial plica resection.     DATE OF SURGERY: 5/3/2018.      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 Surgery occurred 2 weeks ago. Returns today stating he is doing well. Pain has been mild, rated a 2/10. He has returned to work full time a few days after surgery, but has a helper working alongside him to help him. Occasional knee soreness with stairs. No problems with the surgical wounds. Denies fevers chills or night sweats. No associated numbness or tingling. Has been taking it easy since surgery as recommended. Taking aspirin daily. Continues to do exercises.     FINDINGS:  A complex tear of the posterior horn medial meniscus with displaced meniscal flap, horizontal cleavage tear and a small radial tear.  Prominent medial plica.  Trace effusion.  Mild synovitis.  Intact patellofemoral, medial and lateral articular surfaces.  A cystic degeneration of the ACL with a cyst at the ACL tibial footprint.     Past Medical History:   Diagnosis Date     Environmental allergies        Past Surgical History:   Procedure Laterality Date     ARTHROSCOPY KNEE Left 5/3/2018    Procedure: ARTHROSCOPY KNEE;  Left knee arthroscopy, medial meniscus debridement;  Surgeon: Dustin Menchaca MD;  Location:  OR     ORTHOPEDIC SURGERY  1997    L Shoulder reconstruction "       Medications:   Current Outpatient Prescriptions:      aspirin 325 MG EC tablet, Take 1 tablet (325 mg) by mouth daily for 14 days, Disp: 14 tablet, Rfl: 0     GLUCOSAMINE SULFATE PO, Take 500 mg by mouth 2 times daily, Disp: , Rfl:     Allergies: No Known Allergies    REVIEW OF SYSTEMS:   CONSTITUTIONAL:NEGATIVE for fever, chills, night sweats  INTEGUMENTARY/SKIN: NEGATIVE for worrisome wound problems or redness.  MUSCULOSKELETAL:See HPI above  NEURO: NEGATIVE for weakness, dizziness or paresthesias    PHYSICAL EXAM:  /86  Pulse 73  Ht 1.829 m (6')  Wt 99.9 kg (220 lb 3.2 oz)  BMI 29.86 kg/m2   GENERAL APPEARANCE: healthy, alert, no distress  SKIN: no suspicious lesions or rashes  NEURO: Normal strength and tone, mentation intact and speech normal  PSYCH:  mentation appears normal and affect normal, not anxious  RESPIRATORY: No increased work of breathing.    LEFT  LOWER EXTREMITY:  Gait: subtle quadriceps avoidance left.  No Quad atrophy, strength normal.  Intact sensation deep peroneal nerve, superficial peroneal nerve, med/lat tibial nerve, sural nerve, saphenous nerve  Intact EHL, EDL, TA, FHL, GS, quadriceps hamstrings and hip flexors  Toes warm and well perfused, brisk capillary refill. Palpable 2+ dp pulses.  calf soft and nttp or squeeze.  Edema: none    LEFT KNEE EXAM:    Skin: intact, no ecchymosis or erythema  Incisions: skin edges well approximated, sutures in place. Dry. No erythema.  ROM: 0 extension to 120 flexion  Effusion: small to moderate  Tender: NTTP med/lat joint line, anterior or posterior knee           X-RAY: none indicated.      Impression: Keyur Olivares is a 38 year old male 2 weeks status post left knee arthroscopy and partial medial meniscectomy, medial plica resection, doing well.       Plan: routine postoperative knee arthroscopy  * suture removal  * reviewed arthroscopy photos.    * WB status: Cautioned not to overdo it too quickly. Increased activities too soon  will lead to more swelling of the knee and leg, more pain, slower recovery. Expect 4-6 weeks.    * Rest  * Activity modification - avoid activities that aggravate symptoms.  * NSAIDS - regular use for inflammation, with food, as long as no contra-indications. Please discuss with pcp if needed.  * Ice twice daily to three times daily as needed.  * Compression wrap as needed.  * Elevation of extremity to reduce swelling as needed.  * PT decline, states will do home exercise program for strengthening, stretching and range of motion exercises, effusion control.  * Tylenol as needed for pain  * return to clinic as needed.      The information in this document, created by a scribe for me, accurately reflects the services I personally performed and the decisions made by me. I have reviewed and approved this document for accuracy.          Dustin Menchaca M.D., M.S.  Dept. of Orthopaedic Surgery  [facility]

## 2018-05-17 NOTE — LETTER
"    5/17/2018         RE: Keyur Olivares  [address] [place] [address]        Dear Colleague,    Thank you for referring your patient, Keyur Olivares, to the [facility]. Please see a copy of my visit note below.    Chief Complaint   Patient presents with     Surgical Followup     Left knee scope - partial MM; MPR. DOS 5/3/18, 2 week PO. Patient state his knee is feeling a little sore but he gets around ok. He is back to work full time. He has a helper. He has been doing the exercises, stairs are a little difficult.        SURGERY: left knee arthroscopy. ( [facility] )  1.  Left knee arthroscopic partial medial meniscectomy.   2.  Left knee arthroscopic medial plica resection.     DATE OF SURGERY: 5/3/2018.      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 Surgery occurred 2 weeks ago. Returns today stating he is doing well. Pain has been mild, rated a 2/10. He has returned to work full time a few days after surgery, but has a helper working alongside him to help him. Occasional knee soreness with stairs. No problems with the surgical wounds. Denies fevers chills or night sweats. No associated numbness or tingling. Has been taking it easy since surgery as recommended. Taking aspirin daily. Continues to do exercises.     FINDINGS:  A complex tear of the posterior horn medial meniscus with displaced meniscal flap, horizontal cleavage tear and a small radial tear.  Prominent medial plica.  Trace effusion.  Mild synovitis.  Intact patellofemoral, medial and lateral articular surfaces.  A cystic degeneration of the ACL with a cyst at the ACL tibial footprint.     Past Medical History:   Diagnosis Date     Environmental allergies        Past Surgical " History:   Procedure Laterality Date     ARTHROSCOPY KNEE Left 5/3/2018    Procedure: ARTHROSCOPY KNEE;  Left knee arthroscopy, medial meniscus debridement;  Surgeon: Dustin Menchaca MD;  Location:  OR     ORTHOPEDIC SURGERY  1997    L Shoulder reconstruction       Medications:   Current Outpatient Prescriptions:      aspirin 325 MG EC tablet, Take 1 tablet (325 mg) by mouth daily for 14 days, Disp: 14 tablet, Rfl: 0     GLUCOSAMINE SULFATE PO, Take 500 mg by mouth 2 times daily, Disp: , Rfl:     Allergies: No Known Allergies    REVIEW OF SYSTEMS:   CONSTITUTIONAL:NEGATIVE for fever, chills, night sweats  INTEGUMENTARY/SKIN: NEGATIVE for worrisome wound problems or redness.  MUSCULOSKELETAL:See HPI above  NEURO: NEGATIVE for weakness, dizziness or paresthesias    PHYSICAL EXAM:  /86  Pulse 73  Ht 1.829 m (6')  Wt 99.9 kg (220 lb 3.2 oz)  BMI 29.86 kg/m2   GENERAL APPEARANCE: healthy, alert, no distress  SKIN: no suspicious lesions or rashes  NEURO: Normal strength and tone, mentation intact and speech normal  PSYCH:  mentation appears normal and affect normal, not anxious  RESPIRATORY: No increased work of breathing.    LEFT  LOWER EXTREMITY:  Gait: subtle quadriceps avoidance left.  No Quad atrophy, strength normal.  Intact sensation deep peroneal nerve, superficial peroneal nerve, med/lat tibial nerve, sural nerve, saphenous nerve  Intact EHL, EDL, TA, FHL, GS, quadriceps hamstrings and hip flexors  Toes warm and well perfused, brisk capillary refill. Palpable 2+ dp pulses.  calf soft and nttp or squeeze.  Edema: none    LEFT KNEE EXAM:    Skin: intact, no ecchymosis or erythema  Incisions: skin edges well approximated, sutures in place. Dry. No erythema.  ROM: 0 extension to 120 flexion  Effusion: small to moderate  Tender: NTTP med/lat joint line, anterior or posterior knee           X-RAY: none indicated.      Impression: Keyur Olivares is a 38 year old male 2 weeks status post left knee  arthroscopy and partial medial meniscectomy, medial plica resection, doing well.       Plan: routine postoperative knee arthroscopy  * suture removal  * reviewed arthroscopy photos.    * WB status: Cautioned not to overdo it too quickly. Increased activities too soon will lead to more swelling of the knee and leg, more pain, slower recovery. Expect 4-6 weeks.    * Rest  * Activity modification - avoid activities that aggravate symptoms.  * NSAIDS - regular use for inflammation, with food, as long as no contra-indications. Please discuss with pcp if needed.  * Ice twice daily to three times daily as needed.  * Compression wrap as needed.  * Elevation of extremity to reduce swelling as needed.  * PT decline, states will do home exercise program for strengthening, stretching and range of motion exercises, effusion control.  * Tylenol as needed for pain  * return to clinic as needed.      The information in this document, created by a scribe for me, accurately reflects the services I personally performed and the decisions made by me. I have reviewed and approved this document for accuracy.          Dustin Menchaca M.D., M.S.  Dept. of Orthopaedic Surgery  [facility]      Again, thank you for allowing me to participate in the care of your patient.        Sincerely,        Dustin Menchaca MD

## 2020-02-23 ENCOUNTER — HEALTH MAINTENANCE LETTER (OUTPATIENT)
Age: 40
End: 2020-02-23

## (undated) DEVICE — DRAPE STERI U 1015

## (undated) DEVICE — DRAPE SHEET 3/4 78X60"

## (undated) DEVICE — SOL NACL 0.9% IRRIG 3000ML BAG 07972-08

## (undated) DEVICE — Device

## (undated) DEVICE — GLOVE PROTEXIS W/NEU-THERA 8.0  2D73TE80

## (undated) DEVICE — SU PROLENE 3-0 PS-2 18" 8687H

## (undated) DEVICE — DRSG STERI STRIP 1/2X4" R1547

## (undated) DEVICE — BNDG ESMARK 6" STERILE

## (undated) DEVICE — GLOVE PROTEXIS BLUE W/NEU-THERA 7.5  2D73EB75

## (undated) DEVICE — PACK ARTHROSCOPY KNEE SOP15AKFSM

## (undated) DEVICE — BNDG ELASTIC 6"X5YDS UNSTERILE 6611-60

## (undated) DEVICE — BLADE DYONICS INCISOR PLUS ELITE 3.5MM 72200095

## (undated) DEVICE — SOL WATER IRRIG 1000ML BOTTLE 07139-09

## (undated) DEVICE — GLOVE PROTEXIS W/NEU-THERA 7.5  2D73TE75

## (undated) DEVICE — GLOVE PROTEXIS POWDER FREE 8.0 ORTHOPEDIC 2D73ET80

## (undated) RX ORDER — KETOROLAC TROMETHAMINE 30 MG/ML
INJECTION, SOLUTION INTRAMUSCULAR; INTRAVENOUS
Status: DISPENSED
Start: 2018-05-03

## (undated) RX ORDER — CEFAZOLIN SODIUM 2 G/100ML
INJECTION, SOLUTION INTRAVENOUS
Status: DISPENSED
Start: 2018-05-03

## (undated) RX ORDER — BUPIVACAINE HYDROCHLORIDE 5 MG/ML
INJECTION, SOLUTION EPIDURAL; INTRACAUDAL
Status: DISPENSED
Start: 2018-05-03

## (undated) RX ORDER — BUPIVACAINE HYDROCHLORIDE 2.5 MG/ML
INJECTION, SOLUTION EPIDURAL; INFILTRATION; INTRACAUDAL
Status: DISPENSED
Start: 2018-05-03

## (undated) RX ORDER — LIDOCAINE HYDROCHLORIDE 10 MG/ML
INJECTION, SOLUTION EPIDURAL; INFILTRATION; INTRACAUDAL; PERINEURAL
Status: DISPENSED
Start: 2018-05-03

## (undated) RX ORDER — ONDANSETRON 2 MG/ML
INJECTION INTRAMUSCULAR; INTRAVENOUS
Status: DISPENSED
Start: 2018-05-03

## (undated) RX ORDER — GABAPENTIN 300 MG/1
CAPSULE ORAL
Status: DISPENSED
Start: 2018-05-03

## (undated) RX ORDER — GLYCOPYRROLATE 0.2 MG/ML
INJECTION INTRAMUSCULAR; INTRAVENOUS
Status: DISPENSED
Start: 2018-05-03

## (undated) RX ORDER — LIDOCAINE HYDROCHLORIDE 20 MG/ML
INJECTION, SOLUTION EPIDURAL; INFILTRATION; INTRACAUDAL; PERINEURAL
Status: DISPENSED
Start: 2018-05-03

## (undated) RX ORDER — FENTANYL CITRATE 50 UG/ML
INJECTION, SOLUTION INTRAMUSCULAR; INTRAVENOUS
Status: DISPENSED
Start: 2018-05-03

## (undated) RX ORDER — DEXAMETHASONE SODIUM PHOSPHATE 4 MG/ML
INJECTION, SOLUTION INTRA-ARTICULAR; INTRALESIONAL; INTRAMUSCULAR; INTRAVENOUS; SOFT TISSUE
Status: DISPENSED
Start: 2018-05-03

## (undated) RX ORDER — PROPOFOL 10 MG/ML
INJECTION, EMULSION INTRAVENOUS
Status: DISPENSED
Start: 2018-05-03

## (undated) RX ORDER — ACETAMINOPHEN 325 MG/1
TABLET ORAL
Status: DISPENSED
Start: 2018-05-03